# Patient Record
Sex: MALE | Race: WHITE | NOT HISPANIC OR LATINO | Employment: FULL TIME | URBAN - METROPOLITAN AREA
[De-identification: names, ages, dates, MRNs, and addresses within clinical notes are randomized per-mention and may not be internally consistent; named-entity substitution may affect disease eponyms.]

---

## 2017-01-16 ENCOUNTER — OFFICE VISIT (OUTPATIENT)
Dept: FAMILY MEDICINE | Facility: CLINIC | Age: 32
End: 2017-01-16
Payer: COMMERCIAL

## 2017-01-16 VITALS
OXYGEN SATURATION: 97 % | HEIGHT: 71 IN | BODY MASS INDEX: 25.2 KG/M2 | WEIGHT: 180 LBS | DIASTOLIC BLOOD PRESSURE: 81 MMHG | HEART RATE: 84 BPM | SYSTOLIC BLOOD PRESSURE: 123 MMHG | TEMPERATURE: 97.8 F

## 2017-01-16 DIAGNOSIS — F90.2 ATTENTION DEFICIT HYPERACTIVITY DISORDER (ADHD), COMBINED TYPE: Primary | ICD-10-CM

## 2017-01-16 PROCEDURE — 99212 OFFICE O/P EST SF 10 MIN: CPT | Performed by: FAMILY MEDICINE

## 2017-01-16 RX ORDER — DEXTROAMPHETAMINE SACCHARATE, AMPHETAMINE ASPARTATE, DEXTROAMPHETAMINE SULFATE AND AMPHETAMINE SULFATE 5; 5; 5; 5 MG/1; MG/1; MG/1; MG/1
20 TABLET ORAL 2 TIMES DAILY
Qty: 60 TABLET | Refills: 0 | Status: SHIPPED | OUTPATIENT
Start: 2017-01-16 | End: 2017-04-13

## 2017-01-16 RX ORDER — DEXTROAMPHETAMINE SACCHARATE, AMPHETAMINE ASPARTATE, DEXTROAMPHETAMINE SULFATE AND AMPHETAMINE SULFATE 5; 5; 5; 5 MG/1; MG/1; MG/1; MG/1
20 TABLET ORAL 2 TIMES DAILY
Qty: 60 TABLET | Refills: 0 | Status: SHIPPED | OUTPATIENT
Start: 2017-03-18 | End: 2017-04-13

## 2017-01-16 RX ORDER — DEXTROAMPHETAMINE SACCHARATE, AMPHETAMINE ASPARTATE, DEXTROAMPHETAMINE SULFATE AND AMPHETAMINE SULFATE 5; 5; 5; 5 MG/1; MG/1; MG/1; MG/1
20 TABLET ORAL 2 TIMES DAILY
Qty: 60 TABLET | Refills: 0 | Status: SHIPPED | OUTPATIENT
Start: 2017-02-16 | End: 2017-04-13

## 2017-01-16 NOTE — NURSING NOTE
"Chief Complaint   Patient presents with     Recheck Medication     /81 mmHg  Pulse 84  Temp(Src) 97.8  F (36.6  C) (Oral)  Ht 5' 11\" (1.803 m)  Wt 180 lb (81.647 kg)  BMI 25.12 kg/m2  SpO2 97% Estimated body mass index is 25.12 kg/(m^2) as calculated from the following:    Height as of this encounter: 5' 11\" (1.803 m).    Weight as of this encounter: 180 lb (81.647 kg).  BP completed using cuff size: regular       Health Maintenance due pending provider review:  NONE    n/a      Dorita Oliva CMA  "

## 2017-01-16 NOTE — PROGRESS NOTES
SUBJECTIVE:                                                    Germán Hunter is a 31 year old male who presents to clinic today for the following health issues:      Medication Followup of   Adderall    Taking Medication as prescribed: yes    Side Effects:  None    Medication Helping Symptoms:  yes       Has previously been treated for ADHD since he was a kid.    Previously obtained record and this shows that he has been on medication since age 14, stable    Also has some ongoing problems with neck and back soreness, but doesn't need anything for that today          Health Maintenance   Topic Date Due     INFLUENZA VACCINE (SYSTEM ASSIGNED)  09/01/2017     TETANUS IMMUNIZATION (SYSTEM ASSIGNED)  06/02/2024       ROS: As per HPI.    Psych: no depression, no concerns about anxiety  Neuro: no new , numbness, or tingling    I have reviewed and updated the patient's past medical history in the EMR. Current problems are:    Patient Active Problem List   Diagnosis     ADHD (attention deficit hyperactivity disorder)     Controlled substance agreement signed     Overweight (BMI 25.0-29.9)     Past Surgical History   Procedure Laterality Date     Stiches/sutures care  2009     scalp     Stiches/sutures care  2010     forehead       Social History   Substance Use Topics     Smoking status: Never Smoker      Smokeless tobacco: Never Used     Alcohol Use: 0.0 oz/week     0 Standard drinks or equivalent per week      Comment: 3x week     Family History   Problem Relation Age of Onset     Hypertension Mother      Hypertension Father      Neurologic Disorder Father      severe migraines     HEART DISEASE Maternal Grandmother      Depression Paternal Grandmother      DIABETES No family hx of      Coronary Artery Disease No family hx of      Hyperlipidemia No family hx of      CEREBROVASCULAR DISEASE No family hx of      Breast Cancer No family hx of      Colon Cancer No family hx of      Prostate Cancer No family hx of      Other  "Cancer No family hx of      Anxiety Disorder No family hx of      MENTAL ILLNESS No family hx of      Substance Abuse No family hx of      Anesthesia Reaction No family hx of      Asthma No family hx of      OSTEOPOROSIS No family hx of      Genetic Disorder No family hx of      Thyroid Disease No family hx of      Obesity No family hx of      Unknown/Adopted No family hx of          Allergies, reviewed:     Allergies   Allergen Reactions     No Known Drug Allergy        Current Outpatient Prescriptions   Medication Sig Dispense Refill     [START ON 3/18/2017] amphetamine-dextroamphetamine (ADDERALL) 20 MG per tablet Take 1 tablet (20 mg) by mouth 2 times daily 60 tablet 0     [START ON 2/16/2017] amphetamine-dextroamphetamine (ADDERALL) 20 MG per tablet Take 1 tablet (20 mg) by mouth 2 times daily 60 tablet 0     amphetamine-dextroamphetamine (ADDERALL) 20 MG per tablet Take 1 tablet (20 mg) by mouth 2 times daily 60 tablet 0     meloxicam (MOBIC) 15 MG tablet Take 1 tablet (15 mg) by mouth daily 30 tablet 1     cyclobenzaprine (FLEXERIL) 10 MG tablet Take 0.5-1 tablets (5-10 mg) by mouth 3 times daily as needed for muscle spasms 15 tablet 1       Objective:  /81 mmHg  Pulse 84  Temp(Src) 97.8  F (36.6  C) (Oral)  Ht 5' 11\" (1.803 m)  Wt 180 lb (81.647 kg)  BMI 25.12 kg/m2  SpO2 97%  General Appearance: Pleasant, alert, WN/WD in no acute respiratory distress.  Neurologic Exam: Nonfocal, no tremor. Normal gait.  Psychiatric Exam: Alert - appropriate, normal affect    ASSESSMENT/PLAN:    ICD-10-CM    1. Attention deficit hyperactivity disorder (ADHD), combined type F90.2 amphetamine-dextroamphetamine (ADDERALL) 20 MG per tablet     amphetamine-dextroamphetamine (ADDERALL) 20 MG per tablet     amphetamine-dextroamphetamine (ADDERALL) 20 MG per tablet     ADD stable, continue medication    Follow up: Return as needed if symptoms fail to improve    Return to clinic in 3 months for Follow up     Tong Prince " MD [facility]

## 2017-04-13 ENCOUNTER — OFFICE VISIT (OUTPATIENT)
Dept: FAMILY MEDICINE | Facility: CLINIC | Age: 32
End: 2017-04-13
Payer: COMMERCIAL

## 2017-04-13 VITALS
SYSTOLIC BLOOD PRESSURE: 126 MMHG | OXYGEN SATURATION: 97 % | TEMPERATURE: 97.8 F | WEIGHT: 182.4 LBS | HEART RATE: 84 BPM | BODY MASS INDEX: 25.54 KG/M2 | RESPIRATION RATE: 17 BRPM | HEIGHT: 71 IN | DIASTOLIC BLOOD PRESSURE: 76 MMHG

## 2017-04-13 DIAGNOSIS — F90.2 ATTENTION DEFICIT HYPERACTIVITY DISORDER (ADHD), COMBINED TYPE: ICD-10-CM

## 2017-04-13 DIAGNOSIS — M54.2 NECK PAIN: ICD-10-CM

## 2017-04-13 PROCEDURE — 99213 OFFICE O/P EST LOW 20 MIN: CPT | Performed by: FAMILY MEDICINE

## 2017-04-13 RX ORDER — DEXTROAMPHETAMINE SACCHARATE, AMPHETAMINE ASPARTATE, DEXTROAMPHETAMINE SULFATE AND AMPHETAMINE SULFATE 5; 5; 5; 5 MG/1; MG/1; MG/1; MG/1
20 TABLET ORAL 2 TIMES DAILY
Qty: 60 TABLET | Refills: 0 | Status: SHIPPED | OUTPATIENT
Start: 2017-04-13 | End: 2017-10-13

## 2017-04-13 RX ORDER — MELOXICAM 15 MG/1
15 TABLET ORAL DAILY
Qty: 30 TABLET | Refills: 1 | Status: SHIPPED | OUTPATIENT
Start: 2017-04-13 | End: 2017-07-07

## 2017-04-13 RX ORDER — DEXTROAMPHETAMINE SACCHARATE, AMPHETAMINE ASPARTATE, DEXTROAMPHETAMINE SULFATE AND AMPHETAMINE SULFATE 5; 5; 5; 5 MG/1; MG/1; MG/1; MG/1
20 TABLET ORAL 2 TIMES DAILY
Qty: 60 TABLET | Refills: 0 | Status: SHIPPED | OUTPATIENT
Start: 2017-05-13 | End: 2017-10-13

## 2017-04-13 RX ORDER — DEXTROAMPHETAMINE SACCHARATE, AMPHETAMINE ASPARTATE, DEXTROAMPHETAMINE SULFATE AND AMPHETAMINE SULFATE 5; 5; 5; 5 MG/1; MG/1; MG/1; MG/1
20 TABLET ORAL 2 TIMES DAILY
Qty: 60 TABLET | Refills: 0 | Status: SHIPPED | OUTPATIENT
Start: 2017-06-13 | End: 2017-10-13

## 2017-04-13 NOTE — MR AVS SNAPSHOT
"              After Visit Summary   2017    Germán Hunter    MRN: [id number]           Patient Information     Date Of Birth          1985        Visit Information        Provider Department      2017 12:00 PM Tong Sage MD [facility]        Today's Diagnoses     Attention deficit hyperactivity disorder (ADHD), combined type        Neck pain           Follow-ups after your visit        Who to contact     If you have questions or need follow up information about today's clinic visit or your schedule please contact [facility] directly at [phone].  Normal or non-critical lab and imaging results will be communicated to you by [employer], letter or phone within 4 business days after the clinic has received the results. If you do not hear from us within 7 days, please contact the clinic through [employer]urate[employer] or phone. If you have a critical or abnormal lab result, we will notify you by phone as soon as possible.  Submit refill requests through [employer] or call your pharmacy and they will forward the refill request to us. Please allow 3 business days for your refill to be completed.          Additional Information About Your Visit        MyChart Information     [employer] lets you send messages to your doctor, view your test results, renew your prescriptions, schedule appointments and more. To sign up, go to [email or web address] . Click on \"Log in\" on the left side of the screen, which will take you to the Welcome page. Then click on \"Sign up Now\" on the right side of the page.     You will be asked to enter the access code listed below, as well as some personal information. Please follow the directions to create your username and password.     Your access code is: [email or web address]  Expires: 2017 12:23 PM     Your access code will  in 90 days. If you need help or a new code, please call your [facility] or [phone].        Care EveryWhere ID     This is your " "Care EveryWhere ID. This could be used by other organizations to access your [place] medical records  [id number]        Your Vitals Were     Pulse Temperature Respirations Height Pulse Oximetry BMI (Body Mass Index)    84 97.8  F (36.6  C) (Oral) 17 5' 11\" (1.803 m) 97% 25.44 kg/m2       Blood Pressure from Last 3 Encounters:   04/13/17 126/76   01/16/17 123/81   11/18/16 112/78    Weight from Last 3 Encounters:   04/13/17 182 lb 6.4 oz (82.7 kg)   01/16/17 180 lb (81.6 kg)   11/18/16 180 lb (81.6 kg)              Today, you had the following     No orders found for display         Where to get your medicines      These medications were sent to [id number] - [place], [place] - [address]  [address] [address]     Phone:  [phone]     meloxicam 15 MG tablet         Some of these will need a paper prescription and others can be bought over the counter.  Ask your nurse if you have questions.     Bring a paper prescription for each of these medications     amphetamine-dextroamphetamine 20 MG per tablet    amphetamine-dextroamphetamine 20 MG per tablet    amphetamine-dextroamphetamine 20 MG per tablet          Primary Care Provider Office Phone # Fax #    Tong Suresh Sage [phone]       [facility] [address] [address]        Thank you!     Thank you for choosing [facility]  for your care. Our goal is always to provide you with excellent care. Hearing back from our patients is one way we can continue to improve our services. Please take a few minutes to complete the written survey that you may receive in the mail after your visit with us. Thank you!             Your Updated Medication List - Protect others around you: Learn how to safely use, store and throw away your medicines at www.disposemymeds.org.          This list is accurate as of: 4/13/17 12:23 PM.  Always use your most recent med list.       "             Brand Name Dispense Instructions for use    * amphetamine-dextroamphetamine 20 MG per tablet    ADDERALL    60 tablet    Take 1 tablet (20 mg) by mouth 2 times daily       * amphetamine-dextroamphetamine 20 MG per tablet   Start taking on:  5/13/2017    ADDERALL    60 tablet    Take 1 tablet (20 mg) by mouth 2 times daily       * amphetamine-dextroamphetamine 20 MG per tablet   Start taking on:  6/13/2017    ADDERALL    60 tablet    Take 1 tablet (20 mg) by mouth 2 times daily       cyclobenzaprine 10 MG tablet    FLEXERIL    15 tablet    Take 0.5-1 tablets (5-10 mg) by mouth 3 times daily as needed for muscle spasms       meloxicam 15 MG tablet    MOBIC    30 tablet    Take 1 tablet (15 mg) by mouth daily       * Notice:  This list has 3 medication(s) that are the same as other medications prescribed for you. Read the directions carefully, and ask your doctor or other care provider to review them with you.

## 2017-04-13 NOTE — PROGRESS NOTES
SUBJECTIVE:                                                    Germán Hunter is a 32 year old male who presents to clinic today for the following health issues:      Medication Followup of Adderall    Taking Medication as prescribed: yes    Side Effects:  None    Medication Helping Symptoms:  yes       Has previously been treated for ADHD since he was a kid.    Previously obtained record and this shows that he has been on medication since age 14, stable    Also has some ongoing problems with neck and back soreness,       Health Maintenance   Topic Date Due     INFLUENZA VACCINE (SYSTEM ASSIGNED)  09/01/2017     TETANUS IMMUNIZATION (SYSTEM ASSIGNED)  06/02/2024       ROS: As per HPI.    Psych: no depression, no concerns about anxiety  Neuro: no new , numbness, or tingling    I have reviewed and updated the patient's past medical history in the EMR. Current problems are:    Patient Active Problem List   Diagnosis     ADHD (attention deficit hyperactivity disorder)     Controlled substance agreement signed     Overweight (BMI 25.0-29.9)     Past Surgical History:   Procedure Laterality Date     STICHES/SUTURES CARE  2009    scalp     STICHES/SUTURES CARE  2010    forehead       Social History   Substance Use Topics     Smoking status: Never Smoker     Smokeless tobacco: Never Used     Alcohol use 0.0 oz/week     0 Standard drinks or equivalent per week      Comment: 3x week     Family History   Problem Relation Age of Onset     Hypertension Mother      Hypertension Father      Neurologic Disorder Father      severe migraines     HEART DISEASE Maternal Grandmother      Depression Paternal Grandmother      DIABETES No family hx of      Coronary Artery Disease No family hx of      Hyperlipidemia No family hx of      CEREBROVASCULAR DISEASE No family hx of      Breast Cancer No family hx of      Colon Cancer No family hx of      Prostate Cancer No family hx of      Other Cancer No family hx of      Anxiety Disorder No  "family hx of      MENTAL ILLNESS No family hx of      Substance Abuse No family hx of      Anesthesia Reaction No family hx of      Asthma No family hx of      OSTEOPOROSIS No family hx of      Genetic Disorder No family hx of      Thyroid Disease No family hx of      Obesity No family hx of      Unknown/Adopted No family hx of          Allergies, reviewed:     Allergies   Allergen Reactions     No Known Drug Allergy        Current Outpatient Prescriptions   Medication Sig Dispense Refill     [START ON 6/13/2017] amphetamine-dextroamphetamine (ADDERALL) 20 MG per tablet Take 1 tablet (20 mg) by mouth 2 times daily 60 tablet 0     [START ON 5/13/2017] amphetamine-dextroamphetamine (ADDERALL) 20 MG per tablet Take 1 tablet (20 mg) by mouth 2 times daily 60 tablet 0     amphetamine-dextroamphetamine (ADDERALL) 20 MG per tablet Take 1 tablet (20 mg) by mouth 2 times daily 60 tablet 0     meloxicam (MOBIC) 15 MG tablet Take 1 tablet (15 mg) by mouth daily 30 tablet 1     cyclobenzaprine (FLEXERIL) 10 MG tablet Take 0.5-1 tablets (5-10 mg) by mouth 3 times daily as needed for muscle spasms 15 tablet 1       Objective:  /76  Pulse 84  Temp 97.8  F (36.6  C) (Oral)  Resp 17  Ht 5' 11\" (1.803 m)  Wt 182 lb 6.4 oz (82.7 kg)  SpO2 97%  BMI 25.44 kg/m2  General Appearance: Pleasant, alert, WN/WD in no acute respiratory distress.  Neurologic Exam: Nonfocal, no tremor. Normal gait.  Psychiatric Exam: Alert - appropriate, normal affect    ASSESSMENT/PLAN:    ICD-10-CM    1. Attention deficit hyperactivity disorder (ADHD), combined type F90.2 amphetamine-dextroamphetamine (ADDERALL) 20 MG per tablet     amphetamine-dextroamphetamine (ADDERALL) 20 MG per tablet     amphetamine-dextroamphetamine (ADDERALL) 20 MG per tablet   2. Neck pain M54.2      ADD stable, continue medication    Neck pain, long acting nsaids are helpful, continue    Follow up: Return as needed if symptoms fail to improve    Return to clinic in 3 " months for Follow up     Tong Sage MD M[facility]

## 2017-07-07 ENCOUNTER — OFFICE VISIT (OUTPATIENT)
Dept: FAMILY MEDICINE | Facility: CLINIC | Age: 32
End: 2017-07-07
Payer: COMMERCIAL

## 2017-07-07 VITALS
WEIGHT: 181 LBS | OXYGEN SATURATION: 97 % | SYSTOLIC BLOOD PRESSURE: 100 MMHG | RESPIRATION RATE: 16 BRPM | HEART RATE: 88 BPM | TEMPERATURE: 98.4 F | HEIGHT: 71 IN | BODY MASS INDEX: 25.34 KG/M2 | DIASTOLIC BLOOD PRESSURE: 70 MMHG

## 2017-07-07 DIAGNOSIS — F90.2 ATTENTION DEFICIT HYPERACTIVITY DISORDER (ADHD), COMBINED TYPE: Primary | ICD-10-CM

## 2017-07-07 DIAGNOSIS — M54.2 NECK PAIN: ICD-10-CM

## 2017-07-07 DIAGNOSIS — M62.830 BACK MUSCLE SPASM: ICD-10-CM

## 2017-07-07 PROCEDURE — 99213 OFFICE O/P EST LOW 20 MIN: CPT | Performed by: FAMILY MEDICINE

## 2017-07-07 RX ORDER — CYCLOBENZAPRINE HCL 10 MG
5-10 TABLET ORAL 3 TIMES DAILY PRN
Qty: 15 TABLET | Refills: 1 | Status: SHIPPED | OUTPATIENT
Start: 2017-07-07 | End: 2017-10-13

## 2017-07-07 RX ORDER — DEXTROAMPHETAMINE SACCHARATE, AMPHETAMINE ASPARTATE, DEXTROAMPHETAMINE SULFATE AND AMPHETAMINE SULFATE 5; 5; 5; 5 MG/1; MG/1; MG/1; MG/1
20 TABLET ORAL 2 TIMES DAILY
Qty: 60 TABLET | Refills: 0 | Status: SHIPPED | OUTPATIENT
Start: 2017-08-14 | End: 2017-09-13

## 2017-07-07 RX ORDER — DEXTROAMPHETAMINE SACCHARATE, AMPHETAMINE ASPARTATE, DEXTROAMPHETAMINE SULFATE AND AMPHETAMINE SULFATE 5; 5; 5; 5 MG/1; MG/1; MG/1; MG/1
20 TABLET ORAL 2 TIMES DAILY
Qty: 60 TABLET | Refills: 0 | Status: SHIPPED | OUTPATIENT
Start: 2017-07-14 | End: 2017-08-13

## 2017-07-07 RX ORDER — DEXTROAMPHETAMINE SACCHARATE, AMPHETAMINE ASPARTATE, DEXTROAMPHETAMINE SULFATE AND AMPHETAMINE SULFATE 5; 5; 5; 5 MG/1; MG/1; MG/1; MG/1
20 TABLET ORAL 2 TIMES DAILY
Qty: 60 TABLET | Refills: 0 | Status: SHIPPED | OUTPATIENT
Start: 2017-09-14 | End: 2017-10-14

## 2017-07-07 RX ORDER — MELOXICAM 15 MG/1
15 TABLET ORAL DAILY
Qty: 30 TABLET | Refills: 1 | Status: SHIPPED | OUTPATIENT
Start: 2017-07-07 | End: 2017-10-13

## 2017-07-07 NOTE — NURSING NOTE
"Chief Complaint   Patient presents with     A.D.H.D     initial /70 (BP Location: Right arm, Cuff Size: Adult Regular)  Pulse 88  Temp 98.4  F (36.9  C) (Oral)  Resp 16  Ht 5' 11\" (1.803 m)  Wt 181 lb (82.1 kg)  SpO2 97%  BMI 25.24 kg/m2 Estimated body mass index is 25.24 kg/(m^2) as calculated from the following:    Height as of this encounter: 5' 11\" (1.803 m).    Weight as of this encounter: 181 lb (82.1 kg).  BP completed using cuff size: regular.   R arm      Health Maintenance that is potentially due pending provider review:  NONE    n/a    Khanh Duong ma  "

## 2017-07-07 NOTE — MR AVS SNAPSHOT
After Visit Summary   7/7/2017    Germán Hunter    MRN: [id number]           Patient Information     Date Of Birth          1985        Visit Information        Provider Department      7/7/2017 11:00 AM Tong Sage MD [facility]        Today's Diagnoses     Attention deficit hyperactivity disorder (ADHD), combined type    -  1    Neck pain        Back muscle spasm          Care Instructions      [facility]  [phone]    Switch to [facility]            Follow-ups after your visit        Who to contact     If you have questions or need follow up information about today's clinic visit or your schedule please contact [facility] directly at [phone].  Normal or non-critical lab and imaging results will be communicated to you by MyChart, letter or phone within 4 business days after the clinic has received the results. If you do not hear from us within 7 days, please contact the clinic through [employer] or phone. If you have a critical or abnormal lab result, we will notify you by phone as soon as possible.  Submit refill requests through [employer] or call your pharmacy and they will forward the refill request to us. Please allow 3 business days for your refill to be completed.          Additional Information About Your Visit        MyChart Information     [employer] gives you secure access to your electronic health record. If you see a primary care provider, you can also send messages to your care team and make appointments. If you have questions, please call your primary care clinic.  If you do not have a primary care provider, please call [phone] and they will assist you.        Care EveryWhere ID     This is your Care EveryWhere ID. This could be used by other organizations to access your [place] medical records  [id number]        Your Vitals Were     Pulse Temperature Respirations Height Pulse Oximetry BMI (Body Mass Index)    88 98.4  F  "(36.9  C) (Oral) 16 5' 11\" (1.803 m) 97% 25.24 kg/m2       Blood Pressure from Last 3 Encounters:   07/07/17 100/70   04/13/17 126/76   01/16/17 123/81    Weight from Last 3 Encounters:   07/07/17 181 lb (82.1 kg)   04/13/17 182 lb 6.4 oz (82.7 kg)   01/16/17 180 lb (81.6 kg)              Today, you had the following     No orders found for display         Today's Medication Changes          These changes are accurate as of: 7/7/17 12:40 PM.  If you have any questions, ask your nurse or doctor.               These medicines have changed or have updated prescriptions.        Dose/Directions    * amphetamine-dextroamphetamine 20 MG per tablet   Commonly known as:  ADDERALL   This may have changed:  Another medication with the same name was added. Make sure you understand how and when to take each.   Used for:  Attention deficit hyperactivity disorder (ADHD), combined type   Changed by:  Tong Sage MD        Dose:  20 mg   Take 1 tablet (20 mg) by mouth 2 times daily   Quantity:  60 tablet   Refills:  0       * amphetamine-dextroamphetamine 20 MG per tablet   Commonly known as:  ADDERALL   This may have changed:  Another medication with the same name was added. Make sure you understand how and when to take each.   Used for:  Attention deficit hyperactivity disorder (ADHD), combined type   Changed by:  Tong Sage MD        Dose:  20 mg   Take 1 tablet (20 mg) by mouth 2 times daily   Quantity:  60 tablet   Refills:  0       * amphetamine-dextroamphetamine 20 MG per tablet   Commonly known as:  ADDERALL   This may have changed:  Another medication with the same name was added. Make sure you understand how and when to take each.   Used for:  Attention deficit hyperactivity disorder (ADHD), combined type   Changed by:  Tong Sage MD        Dose:  20 mg   Take 1 tablet (20 mg) by mouth 2 times daily   Quantity:  60 tablet   Refills:  0       * amphetamine-dextroamphetamine 20 MG per " tablet   Commonly known as:  ADDERALL   This may have changed:  You were already taking a medication with the same name, and this prescription was added. Make sure you understand how and when to take each.   Used for:  Attention deficit hyperactivity disorder (ADHD), combined type   Changed by:  Tong Sage MD        Dose:  20 mg   Start taking on:  7/14/2017   Take 1 tablet (20 mg) by mouth 2 times daily   Quantity:  60 tablet   Refills:  0       * amphetamine-dextroamphetamine 20 MG per tablet   Commonly known as:  ADDERALL   This may have changed:  You were already taking a medication with the same name, and this prescription was added. Make sure you understand how and when to take each.   Used for:  Attention deficit hyperactivity disorder (ADHD), combined type   Changed by:  Tong Sage MD        Dose:  20 mg   Start taking on:  8/14/2017   Take 1 tablet (20 mg) by mouth 2 times daily   Quantity:  60 tablet   Refills:  0       * amphetamine-dextroamphetamine 20 MG per tablet   Commonly known as:  ADDERALL   This may have changed:  You were already taking a medication with the same name, and this prescription was added. Make sure you understand how and when to take each.   Used for:  Attention deficit hyperactivity disorder (ADHD), combined type   Changed by:  Tong Sage MD        Dose:  20 mg   Start taking on:  9/14/2017   Take 1 tablet (20 mg) by mouth 2 times daily   Quantity:  60 tablet   Refills:  0       * Notice:  This list has 6 medication(s) that are the same as other medications prescribed for you. Read the directions carefully, and ask your doctor or other care provider to review them with you.         Where to get your medicines      These medications were sent to [facility] [id number] [place] [facility] - [place], [place] - [address]  [address] [address]     Phone:  [phone]     cyclobenzaprine 10 MG tablet    meloxicam 15 MG tablet          Some of these will need a paper prescription and others can be bought over the counter.  Ask your nurse if you have questions.     Bring a paper prescription for each of these medications     amphetamine-dextroamphetamine 20 MG per tablet    amphetamine-dextroamphetamine 20 MG per tablet    amphetamine-dextroamphetamine 20 MG per tablet                Primary Care Provider Office Phone # Fax #    Tong Suresh Sage [phone]       [facility] [address] [address]        Equal Access to Services     JOSETTE DENNIS : Hadii aad ku hadasho Soomaali, waaxda luqadaha, qaybta kaalmada adeegyada, waxay idiin hayaan adeeg kharash bharat . So [facility] [phone].    ATENCIÓN: Si habla español, tiene a moulton disposición servicios gratuitos de asistencia lingüística. [facility] [phone].    We comply with applicable federal civil rights laws and [place] laws. We do not discriminate on the basis of race, color, national origin, age, disability sex, sexual orientation or gender identity.            Thank you!     Thank you for choosing [facility]  for your care. Our goal is always to provide you with excellent care. Hearing back from our patients is one way we can continue to improve our services. Please take a few minutes to complete the written survey that you may receive in the mail after your visit with us. Thank you!             Your Updated Medication List - Protect others around you: Learn how to safely use, store and throw away your medicines at www.disposemymeds.org.          This list is accurate as of: 7/7/17 12:40 PM.  Always use your most recent med list.                   Brand Name Dispense Instructions for use Diagnosis    * amphetamine-dextroamphetamine 20 MG per tablet    ADDERALL    60 tablet    Take 1 tablet (20 mg) by mouth 2 times daily    Attention deficit hyperactivity disorder (ADHD), combined type       * amphetamine-dextroamphetamine 20  MG per tablet    ADDERALL    60 tablet    Take 1 tablet (20 mg) by mouth 2 times daily    Attention deficit hyperactivity disorder (ADHD), combined type       * amphetamine-dextroamphetamine 20 MG per tablet    ADDERALL    60 tablet    Take 1 tablet (20 mg) by mouth 2 times daily    Attention deficit hyperactivity disorder (ADHD), combined type       * amphetamine-dextroamphetamine 20 MG per tablet   Start taking on:  7/14/2017    ADDERALL    60 tablet    Take 1 tablet (20 mg) by mouth 2 times daily    Attention deficit hyperactivity disorder (ADHD), combined type       * amphetamine-dextroamphetamine 20 MG per tablet   Start taking on:  8/14/2017    ADDERALL    60 tablet    Take 1 tablet (20 mg) by mouth 2 times daily    Attention deficit hyperactivity disorder (ADHD), combined type       * amphetamine-dextroamphetamine 20 MG per tablet   Start taking on:  9/14/2017    ADDERALL    60 tablet    Take 1 tablet (20 mg) by mouth 2 times daily    Attention deficit hyperactivity disorder (ADHD), combined type       cyclobenzaprine 10 MG tablet    FLEXERIL    15 tablet    Take 0.5-1 tablets (5-10 mg) by mouth 3 times daily as needed for muscle spasms    Back muscle spasm       meloxicam 15 MG tablet    MOBIC    30 tablet    Take 1 tablet (15 mg) by mouth daily    Neck pain       * Notice:  This list has 6 medication(s) that are the same as other medications prescribed for you. Read the directions carefully, and ask your doctor or other care provider to review them with you.

## 2017-07-07 NOTE — PROGRESS NOTES
SUBJECTIVE:                                                    Germán Hunter is a 32 year old male who presents to clinic today for the following health issues:      Medication Followup of Adderall    Taking Medication as prescribed: yes    Side Effects:  None    Medication Helping Symptoms:  yes       Has previously been treated for ADHD since he was a kid.    Previously obtained record and this shows that he has been on medication since age 14, stable      Also has some ongoing problems with neck and back soreness,   Occasionally uses aerobic and Flexeril    Patient was kicked off of his previous insurance and onto [place] [email or web address]  He wants to change this so that he can continue to come here  Gave him the phone number for the county        Health Maintenance   Topic Date Due     INFLUENZA VACCINE (SYSTEM ASSIGNED)  09/01/2017     TETANUS IMMUNIZATION (SYSTEM ASSIGNED)  06/02/2024       ROS: As per HPI.    Psych: no depression, no concerns about anxiety  Neuro: no new , numbness, or tingling    I have reviewed and updated the patient's past medical history in the EMR. Current problems are:    Patient Active Problem List   Diagnosis     ADHD (attention deficit hyperactivity disorder)     Controlled substance agreement signed     Overweight (BMI 25.0-29.9)     Past Surgical History:   Procedure Laterality Date     STICHES/SUTURES CARE  2009    scalp     STICHES/SUTURES CARE  2010    forehead       Social History   Substance Use Topics     Smoking status: Never Smoker     Smokeless tobacco: Never Used     Alcohol use 0.0 oz/week     0 Standard drinks or equivalent per week      Comment: 3x week     Family History   Problem Relation Age of Onset     Hypertension Mother      Hypertension Father      Neurologic Disorder Father      severe migraines     HEART DISEASE Maternal Grandmother      Colon Cancer Maternal Grandmother      Depression Paternal Grandmother      DIABETES No family hx of      Coronary Artery Disease  No family hx of      Hyperlipidemia No family hx of      CEREBROVASCULAR DISEASE No family hx of      Breast Cancer No family hx of      Prostate Cancer No family hx of      Other Cancer No family hx of      Anxiety Disorder No family hx of      MENTAL ILLNESS No family hx of      Substance Abuse No family hx of      Anesthesia Reaction No family hx of      Asthma No family hx of      OSTEOPOROSIS No family hx of      Genetic Disorder No family hx of      Thyroid Disease No family hx of      Obesity No family hx of      Unknown/Adopted No family hx of          Allergies, reviewed:     Allergies   Allergen Reactions     No Known Drug Allergy        Current Outpatient Prescriptions   Medication Sig Dispense Refill     [START ON 7/14/2017] amphetamine-dextroamphetamine (ADDERALL) 20 MG per tablet Take 1 tablet (20 mg) by mouth 2 times daily 60 tablet 0     [START ON 8/14/2017] amphetamine-dextroamphetamine (ADDERALL) 20 MG per tablet Take 1 tablet (20 mg) by mouth 2 times daily 60 tablet 0     [START ON 9/14/2017] amphetamine-dextroamphetamine (ADDERALL) 20 MG per tablet Take 1 tablet (20 mg) by mouth 2 times daily 60 tablet 0     meloxicam (MOBIC) 15 MG tablet Take 1 tablet (15 mg) by mouth daily 30 tablet 1     cyclobenzaprine (FLEXERIL) 10 MG tablet Take 0.5-1 tablets (5-10 mg) by mouth 3 times daily as needed for muscle spasms 15 tablet 1     amphetamine-dextroamphetamine (ADDERALL) 20 MG per tablet Take 1 tablet (20 mg) by mouth 2 times daily 60 tablet 0     amphetamine-dextroamphetamine (ADDERALL) 20 MG per tablet Take 1 tablet (20 mg) by mouth 2 times daily 60 tablet 0     amphetamine-dextroamphetamine (ADDERALL) 20 MG per tablet Take 1 tablet (20 mg) by mouth 2 times daily 60 tablet 0     [DISCONTINUED] meloxicam (MOBIC) 15 MG tablet Take 1 tablet (15 mg) by mouth daily 30 tablet 1       Objective:  /70 (BP Location: Right arm, Cuff Size: Adult Regular)  Pulse 88  Temp 98.4  F (36.9  C) (Oral)  Resp 16  " Ht 5' 11\" (1.803 m)  Wt 181 lb (82.1 kg)  SpO2 97%  BMI 25.24 kg/m2  General Appearance: Pleasant, alert, WN/WD in no acute respiratory distress.  Neurologic Exam: Nonfocal, no tremor. Normal gait.  Psychiatric Exam: Alert - appropriate, normal affect    ASSESSMENT/PLAN:    ICD-10-CM    1. Attention deficit hyperactivity disorder (ADHD), combined type F90.2 amphetamine-dextroamphetamine (ADDERALL) 20 MG per tablet     amphetamine-dextroamphetamine (ADDERALL) 20 MG per tablet     amphetamine-dextroamphetamine (ADDERALL) 20 MG per tablet   2. Neck pain M54.2 meloxicam (MOBIC) 15 MG tablet   3. Back muscle spasm M62.830 cyclobenzaprine (FLEXERIL) 10 MG tablet     ADD stable, continue medication    Neck pain, long acting nsaids are helpful, continue    Follow up: Return as needed if symptoms fail to improve    discussed patient following up with a 3 month E visit or phone visit  And six-month face-to-face visit    Tong Sage MD MPH              "

## 2017-10-13 ENCOUNTER — OFFICE VISIT (OUTPATIENT)
Dept: FAMILY MEDICINE | Facility: CLINIC | Age: 32
End: 2017-10-13
Payer: COMMERCIAL

## 2017-10-13 VITALS
DIASTOLIC BLOOD PRESSURE: 85 MMHG | TEMPERATURE: 97.6 F | HEIGHT: 71 IN | BODY MASS INDEX: 25.1 KG/M2 | HEART RATE: 110 BPM | OXYGEN SATURATION: 97 % | SYSTOLIC BLOOD PRESSURE: 127 MMHG | WEIGHT: 179.3 LBS

## 2017-10-13 DIAGNOSIS — M62.830 BACK MUSCLE SPASM: ICD-10-CM

## 2017-10-13 DIAGNOSIS — F90.2 ATTENTION DEFICIT HYPERACTIVITY DISORDER (ADHD), COMBINED TYPE: ICD-10-CM

## 2017-10-13 DIAGNOSIS — M54.2 NECK PAIN: ICD-10-CM

## 2017-10-13 PROCEDURE — 99213 OFFICE O/P EST LOW 20 MIN: CPT | Performed by: FAMILY MEDICINE

## 2017-10-13 RX ORDER — MELOXICAM 15 MG/1
15 TABLET ORAL DAILY
Qty: 30 TABLET | Refills: 1 | Status: SHIPPED | OUTPATIENT
Start: 2017-10-13 | End: 2018-01-09

## 2017-10-13 RX ORDER — DEXTROAMPHETAMINE SACCHARATE, AMPHETAMINE ASPARTATE, DEXTROAMPHETAMINE SULFATE AND AMPHETAMINE SULFATE 5; 5; 5; 5 MG/1; MG/1; MG/1; MG/1
20 TABLET ORAL 2 TIMES DAILY
Qty: 60 TABLET | Refills: 0 | Status: SHIPPED | OUTPATIENT
Start: 2017-11-13 | End: 2018-01-09

## 2017-10-13 RX ORDER — CYCLOBENZAPRINE HCL 10 MG
5-10 TABLET ORAL 3 TIMES DAILY PRN
Qty: 15 TABLET | Refills: 1 | Status: SHIPPED | OUTPATIENT
Start: 2017-10-13 | End: 2018-07-06

## 2017-10-13 RX ORDER — DEXTROAMPHETAMINE SACCHARATE, AMPHETAMINE ASPARTATE, DEXTROAMPHETAMINE SULFATE AND AMPHETAMINE SULFATE 5; 5; 5; 5 MG/1; MG/1; MG/1; MG/1
20 TABLET ORAL 2 TIMES DAILY
Qty: 60 TABLET | Refills: 0 | Status: SHIPPED | OUTPATIENT
Start: 2017-10-13 | End: 2018-01-09

## 2017-10-13 RX ORDER — DEXTROAMPHETAMINE SACCHARATE, AMPHETAMINE ASPARTATE, DEXTROAMPHETAMINE SULFATE AND AMPHETAMINE SULFATE 5; 5; 5; 5 MG/1; MG/1; MG/1; MG/1
20 TABLET ORAL 2 TIMES DAILY
Qty: 60 TABLET | Refills: 0 | Status: SHIPPED | OUTPATIENT
Start: 2017-12-13 | End: 2018-01-09

## 2017-10-13 NOTE — NURSING NOTE
"Chief Complaint   Patient presents with     Recheck Medication       Initial /85 (BP Location: Left arm, Patient Position: Chair, Cuff Size: Adult Regular)  Pulse 110  Temp 97.6  F (36.4  C) (Oral)  Ht 5' 11\" (1.803 m)  Wt 179 lb 4.8 oz (81.3 kg)  SpO2 97%  BMI 25.01 kg/m2 Estimated body mass index is 25.01 kg/(m^2) as calculated from the following:    Height as of this encounter: 5' 11\" (1.803 m).    Weight as of this encounter: 179 lb 4.8 oz (81.3 kg).  Medication Reconciliation: complete     Ronda Mancia MA     "

## 2017-10-13 NOTE — PROGRESS NOTES
SUBJECTIVE:   Germán Hunter is a 32 year old male who presents to clinic today for the following health issues:      Medication Followup of Adderall, Flexeril, and Mobic     Taking Medication as prescribed: yes    Side Effects:  None    Medication Helping Symptoms:  yes     Has previously been treated for ADHD since he was a kid.    Previously obtained record and this shows that he has been on medication since age 14, stable    Also has some ongoing problems with neck and back soreness,   Occasionally uses aerobic and Flexeril        Health Maintenance   Topic Date Due     INFLUENZA VACCINE (SYSTEM ASSIGNED)  09/01/2017     TETANUS IMMUNIZATION (SYSTEM ASSIGNED)  06/02/2024       ROS: As per HPI.    Psych: no depression, no concerns about anxiety  Neuro: no new , numbness, or tingling    I have reviewed and updated the patient's past medical history in the EMR. Current problems are:    Patient Active Problem List   Diagnosis     ADHD (attention deficit hyperactivity disorder)     Controlled substance agreement signed     Overweight (BMI 25.0-29.9)     Past Surgical History:   Procedure Laterality Date     STICHES/SUTURES CARE  2009    scalp     STICHES/SUTURES CARE  2010    forehead       Social History   Substance Use Topics     Smoking status: Never Smoker     Smokeless tobacco: Never Used     Alcohol use 0.0 oz/week     0 Standard drinks or equivalent per week      Comment: 3x week     Family History   Problem Relation Age of Onset     Hypertension Mother      Hypertension Father      Neurologic Disorder Father      severe migraines     HEART DISEASE Maternal Grandmother      Colon Cancer Maternal Grandmother      Depression Paternal Grandmother      DIABETES No family hx of      Coronary Artery Disease No family hx of      Hyperlipidemia No family hx of      CEREBROVASCULAR DISEASE No family hx of      Breast Cancer No family hx of      Prostate Cancer No family hx of      Other Cancer No family hx of       "Anxiety Disorder No family hx of      MENTAL ILLNESS No family hx of      Substance Abuse No family hx of      Anesthesia Reaction No family hx of      Asthma No family hx of      OSTEOPOROSIS No family hx of      Genetic Disorder No family hx of      Thyroid Disease No family hx of      Obesity No family hx of      Unknown/Adopted No family hx of          Allergies, reviewed:     Allergies   Allergen Reactions     No Known Drug Allergy        Current Outpatient Prescriptions   Medication Sig Dispense Refill     amphetamine-dextroamphetamine (ADDERALL) 20 MG per tablet Take 1 tablet (20 mg) by mouth 2 times daily 60 tablet 0     meloxicam (MOBIC) 15 MG tablet Take 1 tablet (15 mg) by mouth daily 30 tablet 1     cyclobenzaprine (FLEXERIL) 10 MG tablet Take 0.5-1 tablets (5-10 mg) by mouth 3 times daily as needed for muscle spasms 15 tablet 1     amphetamine-dextroamphetamine (ADDERALL) 20 MG per tablet Take 1 tablet (20 mg) by mouth 2 times daily 60 tablet 0     amphetamine-dextroamphetamine (ADDERALL) 20 MG per tablet Take 1 tablet (20 mg) by mouth 2 times daily 60 tablet 0     amphetamine-dextroamphetamine (ADDERALL) 20 MG per tablet Take 1 tablet (20 mg) by mouth 2 times daily 60 tablet 0       Objective:  /85 (BP Location: Left arm, Patient Position: Chair, Cuff Size: Adult Regular)  Pulse 110  Temp 97.6  F (36.4  C) (Oral)  Ht 5' 11\" (1.803 m)  Wt 179 lb 4.8 oz (81.3 kg)  SpO2 97%  BMI 25.01 kg/m2  General Appearance: Pleasant, alert, WN/WD in no acute respiratory distress.  Neurologic Exam: Nonfocal, no tremor. Normal gait.  Psychiatric Exam: Alert - appropriate, normal affect    ASSESSMENT/PLAN:    ICD-10-CM    1. Neck pain M54.2 meloxicam (MOBIC) 15 MG tablet   2. Back muscle spasm M62.830 cyclobenzaprine (FLEXERIL) 10 MG tablet   3. Attention deficit hyperactivity disorder (ADHD), combined type F90.2 amphetamine-dextroamphetamine (ADDERALL) 20 MG per tablet     amphetamine-dextroamphetamine " (ADDERALL) 20 MG per tablet     amphetamine-dextroamphetamine (ADDERALL) 20 MG per tablet     ADD stable, continue medication    Neck pain, long acting nsaids are helpful, continue    Follow up: Return as needed if symptoms fail to improve    discussed patient following up with a 3 month E visit or phone visit  And six-month face-to-face visit    Tong Sage MD MPH

## 2017-10-13 NOTE — MR AVS SNAPSHOT
After Visit Summary   10/13/2017    Germán Hunter    MRN: [id number]           Patient Information     Date Of Birth          1985        Visit Information        Provider Department      10/13/2017 2:00 PM Tong Sage MD [facility]        Today's Diagnoses     Neck pain        Back muscle spasm        Attention deficit hyperactivity disorder (ADHD), combined type           Follow-ups after your visit        Follow-up notes from your care team     Return in about 3 months (around 1/13/2018).      Who to contact     If you have questions or need follow up information about today's clinic visit or your schedule please contact [facility] directly at [phone].  Normal or non-critical lab and imaging results will be communicated to you by MyChart, letter or phone within 4 business days after the clinic has received the results. If you do not hear from us within 7 days, please contact the clinic through [employer] or phone. If you have a critical or abnormal lab result, we will notify you by phone as soon as possible.  Submit refill requests through [employer] or call your pharmacy and they will forward the refill request to us. Please allow 3 business days for your refill to be completed.          Additional Information About Your Visit        MyChart Information     [employer] gives you secure access to your electronic health record. If you see a primary care provider, you can also send messages to your care team and make appointments. If you have questions, please call your primary care clinic.  If you do not have a primary care provider, please call [phone] and they will assist you.        Care EveryWhere ID     This is your Care EveryWhere ID. This could be used by other organizations to access your [facility] medical records  [id number]        Your Vitals Were     Pulse Temperature Height Pulse Oximetry BMI (Body Mass Index)       110 97.6  F (36.4  C) (Oral) 5'  "11\" (1.803 m) 97% 25.01 kg/m2        Blood Pressure from Last 3 Encounters:   10/13/17 127/85   07/07/17 100/70   04/13/17 126/76    Weight from Last 3 Encounters:   10/13/17 179 lb 4.8 oz (81.3 kg)   07/07/17 181 lb (82.1 kg)   04/13/17 182 lb 6.4 oz (82.7 kg)              Today, you had the following     No orders found for display         Where to get your medicines      These medications were sent to [facility] [id number] [facility] - [place], [place] - [address]  [address] [facility] [facility] [address]     Phone:  [phone]     cyclobenzaprine 10 MG tablet    meloxicam 15 MG tablet         Some of these will need a paper prescription and others can be bought over the counter.  Ask your nurse if you have questions.     Bring a paper prescription for each of these medications     amphetamine-dextroamphetamine 20 MG per tablet    amphetamine-dextroamphetamine 20 MG per tablet    amphetamine-dextroamphetamine 20 MG per tablet          Primary Care Provider Office Phone # Fax #    Tong Suresh Sage [phone] [address] [facility] [address]        Equal Access to Services     JOSETTE DENNIS : Hadii moncho chambers hadmichelleo Somadelineali, waaxda luqadaha, qaybta kaalmada adeegyada, rubén massey. So [facility] [phone].    ATENCIÓN: Si habla español, tiene a moulton disposición servicios gratuitos de asistencia lingüística. Llame al [phone].    We comply with applicable federal civil rights laws and [place] laws. We do not discriminate on the basis of race, color, national origin, age, disability, sex, sexual orientation, or gender identity.            Thank you!     Thank you for choosing [facility]  for your care. Our goal is always to provide you with excellent care. Hearing back from our patients is one way we can continue to improve our services. Please take a few minutes to complete the written survey that you may receive in the mail after " your visit with us. Thank you!             Your Updated Medication List - Protect others around you: Learn how to safely use, store and throw away your medicines at www.disposemymeds.org.          This list is accurate as of: 10/13/17 11:59 PM.  Always use your most recent med list.                   Brand Name Dispense Instructions for use Diagnosis    * amphetamine-dextroamphetamine 20 MG per tablet    ADDERALL    60 tablet    Take 1 tablet (20 mg) by mouth 2 times daily    Attention deficit hyperactivity disorder (ADHD), combined type       * amphetamine-dextroamphetamine 20 MG per tablet    ADDERALL    60 tablet    Take 1 tablet (20 mg) by mouth 2 times daily    Attention deficit hyperactivity disorder (ADHD), combined type       * amphetamine-dextroamphetamine 20 MG per tablet   Start taking on:  11/13/2017    ADDERALL    60 tablet    Take 1 tablet (20 mg) by mouth 2 times daily    Attention deficit hyperactivity disorder (ADHD), combined type       * amphetamine-dextroamphetamine 20 MG per tablet   Start taking on:  12/13/2017    ADDERALL    60 tablet    Take 1 tablet (20 mg) by mouth 2 times daily    Attention deficit hyperactivity disorder (ADHD), combined type       cyclobenzaprine 10 MG tablet    FLEXERIL    15 tablet    Take 0.5-1 tablets (5-10 mg) by mouth 3 times daily as needed for muscle spasms    Back muscle spasm       meloxicam 15 MG tablet    MOBIC    30 tablet    Take 1 tablet (15 mg) by mouth daily    Neck pain       * Notice:  This list has 4 medication(s) that are the same as other medications prescribed for you. Read the directions carefully, and ask your doctor or other care provider to review them with you.

## 2018-01-09 ENCOUNTER — OFFICE VISIT (OUTPATIENT)
Dept: FAMILY MEDICINE | Facility: CLINIC | Age: 33
End: 2018-01-09
Payer: COMMERCIAL

## 2018-01-09 VITALS
TEMPERATURE: 97.3 F | HEART RATE: 94 BPM | WEIGHT: 182 LBS | OXYGEN SATURATION: 98 % | SYSTOLIC BLOOD PRESSURE: 104 MMHG | BODY MASS INDEX: 25.48 KG/M2 | HEIGHT: 71 IN | DIASTOLIC BLOOD PRESSURE: 70 MMHG | RESPIRATION RATE: 14 BRPM

## 2018-01-09 DIAGNOSIS — F90.2 ATTENTION DEFICIT HYPERACTIVITY DISORDER (ADHD), COMBINED TYPE: Primary | ICD-10-CM

## 2018-01-09 DIAGNOSIS — S29.012S UPPER BACK STRAIN, SEQUELA: ICD-10-CM

## 2018-01-09 PROCEDURE — 99213 OFFICE O/P EST LOW 20 MIN: CPT | Performed by: FAMILY MEDICINE

## 2018-01-09 RX ORDER — DEXTROAMPHETAMINE SACCHARATE, AMPHETAMINE ASPARTATE, DEXTROAMPHETAMINE SULFATE AND AMPHETAMINE SULFATE 5; 5; 5; 5 MG/1; MG/1; MG/1; MG/1
20 TABLET ORAL 2 TIMES DAILY
Qty: 60 TABLET | Refills: 0 | Status: SHIPPED | OUTPATIENT
Start: 2018-01-09 | End: 2018-01-17

## 2018-01-09 RX ORDER — DEXTROAMPHETAMINE SACCHARATE, AMPHETAMINE ASPARTATE MONOHYDRATE, DEXTROAMPHETAMINE SULFATE AND AMPHETAMINE SULFATE 5; 5; 5; 5 MG/1; MG/1; MG/1; MG/1
20 CAPSULE, EXTENDED RELEASE ORAL DAILY
Qty: 30 CAPSULE | Refills: 0 | Status: SHIPPED | OUTPATIENT
Start: 2018-03-12 | End: 2018-04-11

## 2018-01-09 RX ORDER — DEXTROAMPHETAMINE SACCHARATE, AMPHETAMINE ASPARTATE MONOHYDRATE, DEXTROAMPHETAMINE SULFATE AND AMPHETAMINE SULFATE 5; 5; 5; 5 MG/1; MG/1; MG/1; MG/1
20 CAPSULE, EXTENDED RELEASE ORAL DAILY
Qty: 30 CAPSULE | Refills: 0 | Status: SHIPPED | OUTPATIENT
Start: 2018-01-09 | End: 2018-02-08

## 2018-01-09 RX ORDER — DEXTROAMPHETAMINE SACCHARATE, AMPHETAMINE ASPARTATE, DEXTROAMPHETAMINE SULFATE AND AMPHETAMINE SULFATE 5; 5; 5; 5 MG/1; MG/1; MG/1; MG/1
20 TABLET ORAL 2 TIMES DAILY
Qty: 60 TABLET | Refills: 0 | Status: SHIPPED | OUTPATIENT
Start: 2018-01-09 | End: 2018-01-18

## 2018-01-09 RX ORDER — DEXTROAMPHETAMINE SACCHARATE, AMPHETAMINE ASPARTATE MONOHYDRATE, DEXTROAMPHETAMINE SULFATE AND AMPHETAMINE SULFATE 5; 5; 5; 5 MG/1; MG/1; MG/1; MG/1
20 CAPSULE, EXTENDED RELEASE ORAL DAILY
Qty: 30 CAPSULE | Refills: 0 | Status: SHIPPED | OUTPATIENT
Start: 2018-02-09 | End: 2018-03-11

## 2018-01-09 RX ORDER — CELECOXIB 100 MG/1
100 CAPSULE ORAL 2 TIMES DAILY PRN
Qty: 60 CAPSULE | Refills: 1 | Status: SHIPPED | OUTPATIENT
Start: 2018-01-09 | End: 2018-05-16

## 2018-01-09 NOTE — PROGRESS NOTES
SUBJECTIVE:   Germán Hunter is a 32 year old male who presents to clinic today for the following health issues:      Medication Followup of Adderall    Taking Medication as prescribed: yes    Side Effects:  None    Medication Helping Symptoms:  yes     Would like cholesterol orders for future appt-not fasting today      Has previously been treated for ADHD since he was a kid.    Previously obtained record and this shows that he has been on medication since age 14, stable    Also has some ongoing problems with neck and back soreness,   Occasionally uses Mobic and Flexeril        Health Maintenance   Topic Date Due     INFLUENZA VACCINE (SYSTEM ASSIGNED)  09/01/2017     TETANUS IMMUNIZATION (SYSTEM ASSIGNED)  06/02/2024       ROS: As per HPI.    Psych: no depression, no concerns about anxiety  Neuro: no new , numbness, or tingling    I have reviewed and updated the patient's past medical history in the EMR. Current problems are:    Patient Active Problem List   Diagnosis     ADHD (attention deficit hyperactivity disorder)     Controlled substance agreement signed     Overweight (BMI 25.0-29.9)     Past Surgical History:   Procedure Laterality Date     STICHES/SUTURES CARE  2009    scalp     STICHES/SUTURES CARE  2010    forehead       Social History   Substance Use Topics     Smoking status: Never Smoker     Smokeless tobacco: Never Used     Alcohol use 0.0 oz/week     0 Standard drinks or equivalent per week      Comment: 3x week     Family History   Problem Relation Age of Onset     Hypertension Mother      Hypertension Father      Neurologic Disorder Father      severe migraines     HEART DISEASE Maternal Grandmother      Colon Cancer Maternal Grandmother      Depression Paternal Grandmother      DIABETES No family hx of      Coronary Artery Disease No family hx of      Hyperlipidemia No family hx of      CEREBROVASCULAR DISEASE No family hx of      Breast Cancer No family hx of      Prostate Cancer No family hx  "of      Other Cancer No family hx of      Anxiety Disorder No family hx of      MENTAL ILLNESS No family hx of      Substance Abuse No family hx of      Anesthesia Reaction No family hx of      Asthma No family hx of      OSTEOPOROSIS No family hx of      Genetic Disorder No family hx of      Thyroid Disease No family hx of      Obesity No family hx of      Unknown/Adopted No family hx of          Allergies, reviewed:     Allergies   Allergen Reactions     No Known Drug Allergy        Current Outpatient Prescriptions   Medication Sig Dispense Refill     amphetamine-dextroamphetamine (ADDERALL) 20 MG per tablet Take 1 tablet (20 mg) by mouth 2 times daily 60 tablet 0     amphetamine-dextroamphetamine (ADDERALL) 20 MG per tablet Take 1 tablet (20 mg) by mouth 2 times daily 60 tablet 0     amphetamine-dextroamphetamine (ADDERALL) 20 MG per tablet Take 1 tablet (20 mg) by mouth 2 times daily 60 tablet 0     amphetamine-dextroamphetamine (ADDERALL XR) 20 MG per 24 hr capsule Take 1 capsule (20 mg) by mouth daily 30 capsule 0     [START ON 2/9/2018] amphetamine-dextroamphetamine (ADDERALL XR) 20 MG per 24 hr capsule Take 1 capsule (20 mg) by mouth daily 30 capsule 0     [START ON 3/12/2018] amphetamine-dextroamphetamine (ADDERALL XR) 20 MG per 24 hr capsule Take 1 capsule (20 mg) by mouth daily 30 capsule 0     celecoxib (CELEBREX) 100 MG capsule Take 1 capsule (100 mg) by mouth 2 times daily as needed for moderate pain 60 capsule 1     meloxicam (MOBIC) 15 MG tablet Take 1 tablet (15 mg) by mouth daily 30 tablet 1     cyclobenzaprine (FLEXERIL) 10 MG tablet Take 0.5-1 tablets (5-10 mg) by mouth 3 times daily as needed for muscle spasms 15 tablet 1       Objective:  /70  Pulse 94  Temp 97.3  F (36.3  C) (Tympanic)  Resp 14  Ht 5' 11\" (1.803 m)  Wt 182 lb (82.6 kg)  SpO2 98%  BMI 25.38 kg/m2  General Appearance: Pleasant, alert, WN/WD in no acute respiratory distress.  Neurologic Exam: Nonfocal, no tremor. " Normal gait.  Psychiatric Exam: Alert - appropriate, normal affect    ASSESSMENT/PLAN:    ICD-10-CM    1. Attention deficit hyperactivity disorder (ADHD), combined type F90.2 amphetamine-dextroamphetamine (ADDERALL) 20 MG per tablet     amphetamine-dextroamphetamine (ADDERALL) 20 MG per tablet     amphetamine-dextroamphetamine (ADDERALL) 20 MG per tablet     Lipid panel reflex to direct LDL Fasting     Glucose     amphetamine-dextroamphetamine (ADDERALL XR) 20 MG per 24 hr capsule     amphetamine-dextroamphetamine (ADDERALL XR) 20 MG per 24 hr capsule     amphetamine-dextroamphetamine (ADDERALL XR) 20 MG per 24 hr capsule   2. Upper back strain, sequela S29.012S celecoxib (CELEBREX) 100 MG capsule     ADD stable, continue medication    Neck pain, long acting nsaids are helpful, continue    He sometimes gets side effects from Mobic and would like to try Celebrex instead    Follow up: Return as needed if symptoms fail to improve    discussed patient following up with a 3 month E visit or phone visit  And six-month face-to-face visit    Tong Sage MD MPH

## 2018-01-09 NOTE — LETTER
[facility]  [address] [place]  [facility] [id number] [phone]        September 3, 2019    Germán Hunter  [address] [facility] [address]              Dear Germán Hunter    This is to remind you that your fasting labs are due.    You may call our office at [phone] to schedule an appointment.    Please disregard this notice if you have already had your labs drawn or made an appointment.        Sincerely,        Tong Sage MD

## 2018-01-09 NOTE — NURSING NOTE
"Chief Complaint   Patient presents with     Recheck Medication     follow up for Adderall refills       Initial /70  Pulse 94  Temp 97.3  F (36.3  C) (Tympanic)  Resp 14  Ht 5' 11\" (1.803 m)  Wt 182 lb (82.6 kg)  SpO2 98%  BMI 25.38 kg/m2 Estimated body mass index is 25.38 kg/(m^2) as calculated from the following:    Height as of this encounter: 5' 11\" (1.803 m).    Weight as of this encounter: 182 lb (82.6 kg).  BP completed using cuff size: large    Health Maintenance that is potentially due pending provider review:  Health Maintenance Due   Topic Date Due     INFLUENZA VACCINE (SYSTEM ASSIGNED)  09/01/2017         Flu vaccine done 9-2017  "

## 2018-01-09 NOTE — LETTER
[facility]  [address]  [facility] [id number] [phone]        January 15, 2019    Germán Hunter  [address] [facility] [address]              Dear Germán Hunter    This is to remind you that your fasting labs are due.    You may call our office at [phone] to schedule an appointment.    Please disregard this notice if you have already had your labs drawn or made an appointment.        Sincerely,        Tong Sage MD

## 2018-01-09 NOTE — LETTER
[facility]  [address]  [facility] [id number] [phone]        December 2, 2019    Germán Hunter  [address] [facility] [address]              Dear Germán Hunter    This is to remind you that your fasting labs are due.    You may call our office at [phone] to schedule an appointment.    Please disregard this notice if you have already had your labs drawn or made an appointment.        Sincerely,        Tong Sage MD

## 2018-01-09 NOTE — LETTER
[facility]  [address] [place]  [facility] [id number] [phone]        April 1, 2019    Germán Hunter  [address] [facility] [address]              Dear Germán Hunter    This is to remind you that your fasting labs are due.    You may call our office at [phone] to schedule an appointment.    Please disregard this notice if you have already had your labs drawn or made an appointment.        Sincerely,        Tong Sage MD

## 2018-01-09 NOTE — LETTER
[facility]  [address]  [facility] [id number] [phone]        June 27, 2019    Germán Hunter  [address] [facility] [address]              Dear Germán Hunter    This is to remind you that your fasting labs are due.    You may call our office at [phone] to schedule an appointment.    Please disregard this notice if you have already had your labs drawn or made an appointment.        Sincerely,        Tong Sage MD

## 2018-01-13 ENCOUNTER — TELEPHONE (OUTPATIENT)
Dept: FAMILY MEDICINE | Facility: CLINIC | Age: 33
End: 2018-01-13

## 2018-01-13 DIAGNOSIS — F90.2 ATTENTION DEFICIT HYPERACTIVITY DISORDER (ADHD), COMBINED TYPE: ICD-10-CM

## 2018-01-13 NOTE — TELEPHONE ENCOUNTER
Patient left a message at 2:14pm today for Dr. Sage. He came in last week for a medication recheck. The 3 scripts he received were written for 30 tablets instead of 60. He filled the script for this month and got his 30 tablets but is needing Dr. Sage to write another script for this month and the following 2 months for the other 30 tablets. Please call to discuss if needed and to confirm once the scripts are ready for .    May BANEGAS  Central Scheduler

## 2018-01-15 NOTE — TELEPHONE ENCOUNTER
Left message for patient to callback.  Want to confirm his correct dose and instructions  Looks like [facility] printed XR once daily  But patient was on IR twice daily before?  Rebeca SAGASTUME RN

## 2018-01-17 NOTE — TELEPHONE ENCOUNTER
PT called pt has been taking 40 mg a day 20 mg in the morning and 20 mg in the afternoon. Please call pt if the RX is available

## 2018-01-17 NOTE — TELEPHONE ENCOUNTER
JF  Pt only received the XR prescriptions (rec'd 3 Rxs at [facility]), already filled the January's prescription so would like to exchange February and March's for the IR. According to chart 3 prescriptions for IR were printed at  as well, do you know where these are?    Pended new IR for February and March, please advise,    Nichelle Arana, RN

## 2018-01-18 RX ORDER — DEXTROAMPHETAMINE SACCHARATE, AMPHETAMINE ASPARTATE, DEXTROAMPHETAMINE SULFATE AND AMPHETAMINE SULFATE 5; 5; 5; 5 MG/1; MG/1; MG/1; MG/1
20 TABLET ORAL 2 TIMES DAILY
Qty: 60 TABLET | Refills: 0 | Status: SHIPPED | OUTPATIENT
Start: 2018-03-12 | End: 2018-04-12

## 2018-01-18 RX ORDER — DEXTROAMPHETAMINE SACCHARATE, AMPHETAMINE ASPARTATE, DEXTROAMPHETAMINE SULFATE AND AMPHETAMINE SULFATE 5; 5; 5; 5 MG/1; MG/1; MG/1; MG/1
20 TABLET ORAL 2 TIMES DAILY
Qty: 30 TABLET | Refills: 0 | Status: SHIPPED | OUTPATIENT
Start: 2018-01-18 | End: 2018-04-12

## 2018-01-18 RX ORDER — DEXTROAMPHETAMINE SACCHARATE, AMPHETAMINE ASPARTATE, DEXTROAMPHETAMINE SULFATE AND AMPHETAMINE SULFATE 5; 5; 5; 5 MG/1; MG/1; MG/1; MG/1
20 TABLET ORAL 2 TIMES DAILY
Qty: 60 TABLET | Refills: 0 | Status: SHIPPED | OUTPATIENT
Start: 2018-02-12 | End: 2018-04-12

## 2018-01-18 NOTE — TELEPHONE ENCOUNTER
JF  Patient also needs Rx for #30 for rest of this month.  The one he filled this month was only for #30.  Order T'd up.  Thanks, Autumn Almeida RN    Patient aware Rx ready to  and needs to exchange February and March Rx for #30

## 2018-01-23 NOTE — TELEPHONE ENCOUNTER
Two Rx's for #30 once daily exchanged for three corrected Rx's  Destroyed 2 Rx's with witness SHAQUILLE Elizondo RN

## 2018-04-12 ENCOUNTER — OFFICE VISIT (OUTPATIENT)
Dept: FAMILY MEDICINE | Facility: CLINIC | Age: 33
End: 2018-04-12
Payer: COMMERCIAL

## 2018-04-12 VITALS
TEMPERATURE: 98.7 F | HEART RATE: 88 BPM | SYSTOLIC BLOOD PRESSURE: 138 MMHG | DIASTOLIC BLOOD PRESSURE: 87 MMHG | OXYGEN SATURATION: 97 % | WEIGHT: 177.3 LBS | HEIGHT: 71 IN | BODY MASS INDEX: 24.82 KG/M2

## 2018-04-12 DIAGNOSIS — F90.2 ATTENTION DEFICIT HYPERACTIVITY DISORDER (ADHD), COMBINED TYPE: ICD-10-CM

## 2018-04-12 DIAGNOSIS — G47.21 DELAYED SLEEP PHASE SYNDROME: Primary | ICD-10-CM

## 2018-04-12 PROCEDURE — 99213 OFFICE O/P EST LOW 20 MIN: CPT | Performed by: FAMILY MEDICINE

## 2018-04-12 RX ORDER — DEXTROAMPHETAMINE SACCHARATE, AMPHETAMINE ASPARTATE, DEXTROAMPHETAMINE SULFATE AND AMPHETAMINE SULFATE 5; 5; 5; 5 MG/1; MG/1; MG/1; MG/1
20 TABLET ORAL 2 TIMES DAILY
Qty: 60 TABLET | Refills: 0 | Status: CANCELLED | OUTPATIENT
Start: 2018-04-12

## 2018-04-12 RX ORDER — DEXTROAMPHETAMINE SACCHARATE, AMPHETAMINE ASPARTATE, DEXTROAMPHETAMINE SULFATE AND AMPHETAMINE SULFATE 5; 5; 5; 5 MG/1; MG/1; MG/1; MG/1
20 TABLET ORAL 2 TIMES DAILY
Qty: 60 TABLET | Refills: 0 | Status: SHIPPED | OUTPATIENT
Start: 2018-05-12 | End: 2018-07-06

## 2018-04-12 RX ORDER — DEXTROAMPHETAMINE SACCHARATE, AMPHETAMINE ASPARTATE, DEXTROAMPHETAMINE SULFATE AND AMPHETAMINE SULFATE 5; 5; 5; 5 MG/1; MG/1; MG/1; MG/1
20 TABLET ORAL 2 TIMES DAILY
Qty: 60 TABLET | Refills: 0 | Status: SHIPPED | OUTPATIENT
Start: 2018-06-12 | End: 2018-07-06

## 2018-04-12 RX ORDER — DEXTROAMPHETAMINE SACCHARATE, AMPHETAMINE ASPARTATE, DEXTROAMPHETAMINE SULFATE AND AMPHETAMINE SULFATE 5; 5; 5; 5 MG/1; MG/1; MG/1; MG/1
20 TABLET ORAL 2 TIMES DAILY
Qty: 60 TABLET | Refills: 0 | Status: SHIPPED | OUTPATIENT
Start: 2018-04-12 | End: 2018-07-06

## 2018-04-12 NOTE — MR AVS SNAPSHOT
"              After Visit Summary   4/12/2018    Germán Hunter    MRN: [id number]           Patient Information     Date Of Birth          1985        Visit Information        Provider Department      4/12/2018 12:00 PM Tong Sage MD [facility]        Today's Diagnoses     Delayed sleep phase syndrome    -  1    Attention deficit hyperactivity disorder (ADHD), combined type           Follow-ups after your visit        Who to contact     If you have questions or need follow up information about today's clinic visit or your schedule please contact [facility] directly at [phone].  Normal or non-critical lab and imaging results will be communicated to you by MyChart, letter or phone within 4 business days after the clinic has received the results. If you do not hear from us within 7 days, please contact the clinic through [employer] or phone. If you have a critical or abnormal lab result, we will notify you by phone as soon as possible.  Submit refill requests through [email or web address] or call your pharmacy and they will forward the refill request to us. Please allow 3 business days for your refill to be completed.          Additional Information About Your Visit        MyChart Information     [email or web address] gives you secure access to your electronic health record. If you see a primary care provider, you can also send messages to your care team and make appointments. If you have questions, please call your primary care clinic.  If you do not have a primary care provider, please call [phone] and they will assist you.        Care EveryWhere ID     This is your Care EveryWhere ID. This could be used by other organizations to access your [place] medical records  [id number]        Your Vitals Were     Pulse Temperature Height Pulse Oximetry BMI (Body Mass Index)       88 98.7  F (37.1  C) (Oral) 5' 11\" (1.803 m) 97% 24.73 kg/m2        Blood Pressure from Last 3 Encounters:   04/12/18 138/87 "   01/09/18 104/70   10/13/17 127/85    Weight from Last 3 Encounters:   04/12/18 177 lb 4.8 oz (80.4 kg)   01/09/18 182 lb (82.6 kg)   10/13/17 179 lb 4.8 oz (81.3 kg)              Today, you had the following     No orders found for display         Where to get your medicines      Some of these will need a paper prescription and others can be bought over the counter.  Ask your nurse if you have questions.     Bring a paper prescription for each of these medications     amphetamine-dextroamphetamine 20 MG per tablet    amphetamine-dextroamphetamine 20 MG per tablet    amphetamine-dextroamphetamine 20 MG per tablet          Primary Care Provider Office Phone # Fax #    Tong Suresh Sage [phone] [address] [address]        Equal Access to Services     SAUL DENNIS : Hadii moncho chambers hadasho Sobelinda, waaxda luqadaha, qaybta kaalmada kennedy, rubén nicholson . So [facility] [phone].    ATENCIÓN: Si habla español, tiene a moulton disposición servicios gratuitos de asistencia lingüística. Rose al [phone].    We comply with applicable federal civil rights laws and [place] laws. We do not discriminate on the basis of race, color, national origin, age, disability, sex, sexual orientation, or gender identity.            Thank you!     Thank you for choosing [facility]  for your care. Our goal is always to provide you with excellent care. Hearing back from our patients is one way we can continue to improve our services. Please take a few minutes to complete the written survey that you may receive in the mail after your visit with us. Thank you!             Your Updated Medication List - Protect others around you: Learn how to safely use, store and throw away your medicines at www.disposemymeds.org.          This list is accurate as of 4/12/18 12:42 PM.  Always use your most recent med list.                   Brand Name Dispense  Instructions for use Diagnosis    * amphetamine-dextroamphetamine 20 MG per tablet    ADDERALL    60 tablet    Take 1 tablet (20 mg) by mouth 2 times daily    Attention deficit hyperactivity disorder (ADHD), combined type       * amphetamine-dextroamphetamine 20 MG per tablet   Start taking on:  5/12/2018    ADDERALL    60 tablet    Take 1 tablet (20 mg) by mouth 2 times daily    Attention deficit hyperactivity disorder (ADHD), combined type       * amphetamine-dextroamphetamine 20 MG per tablet   Start taking on:  6/12/2018    ADDERALL    60 tablet    Take 1 tablet (20 mg) by mouth 2 times daily    Attention deficit hyperactivity disorder (ADHD), combined type       celecoxib 100 MG capsule    celeBREX    60 capsule    Take 1 capsule (100 mg) by mouth 2 times daily as needed for moderate pain    Upper back strain, sequela       cyclobenzaprine 10 MG tablet    FLEXERIL    15 tablet    Take 0.5-1 tablets (5-10 mg) by mouth 3 times daily as needed for muscle spasms    Back muscle spasm       * Notice:  This list has 3 medication(s) that are the same as other medications prescribed for you. Read the directions carefully, and ask your doctor or other care provider to review them with you.

## 2018-04-12 NOTE — PROGRESS NOTES
SUBJECTIVE:   Germán Hunter is a 33 year old male who presents to clinic today for the following health issues:        Medication Followup of Adderall    Taking Medication as prescribed: yes    Side Effects:  None    Medication Helping Symptoms:  yes     Has previously been treated for ADHD since he was a kid.    Previously obtained record and this shows that he has been on medication since age 14, stable    Her wonders about delayed sleep phase  He wake up at 11am and goes to bed at 2am  Always has done this  Mainly an issue because his partner does not have this shedule    Also has some ongoing problems with neck and back soreness,   Occasionally uses celebrex and Flexeril        Health Maintenance   Topic Date Due     LIPID SCREEN Q5 YR MALE (SYSTEM ASSIGNED)  03/14/2018     INFLUENZA VACCINE (SYSTEM ASSIGNED)  09/01/2018     TETANUS IMMUNIZATION (SYSTEM ASSIGNED)  06/02/2024       ROS: As per HPI.    Psych: no depression, no concerns about anxiety  Neuro: no new , numbness, or tingling    I have reviewed and updated the patient's past medical history in the EMR. Current problems are:    Patient Active Problem List   Diagnosis     ADHD (attention deficit hyperactivity disorder)     Controlled substance agreement signed     Overweight (BMI 25.0-29.9)     Delayed sleep phase syndrome     Past Surgical History:   Procedure Laterality Date     STICHES/SUTURES CARE  2009    scalp     STICHES/SUTURES CARE  2010    forehead       Social History   Substance Use Topics     Smoking status: Never Smoker     Smokeless tobacco: Never Used     Alcohol use 0.0 oz/week     0 Standard drinks or equivalent per week      Comment: 3x week     Family History   Problem Relation Age of Onset     Hypertension Mother      Hypertension Father      Neurologic Disorder Father      severe migraines     HEART DISEASE Maternal Grandmother      Colon Cancer Maternal Grandmother      Depression Paternal Grandmother      DIABETES No family hx of  "     Coronary Artery Disease No family hx of      Hyperlipidemia No family hx of      CEREBROVASCULAR DISEASE No family hx of      Breast Cancer No family hx of      Prostate Cancer No family hx of      Other Cancer No family hx of      Anxiety Disorder No family hx of      MENTAL ILLNESS No family hx of      Substance Abuse No family hx of      Anesthesia Reaction No family hx of      Asthma No family hx of      OSTEOPOROSIS No family hx of      Genetic Disorder No family hx of      Thyroid Disease No family hx of      Obesity No family hx of      Unknown/Adopted No family hx of          Allergies, reviewed:     Allergies   Allergen Reactions     No Known Drug Allergy        Current Outpatient Prescriptions   Medication Sig Dispense Refill     amphetamine-dextroamphetamine (ADDERALL) 20 MG per tablet Take 1 tablet (20 mg) by mouth 2 times daily 60 tablet 0     [START ON 5/12/2018] amphetamine-dextroamphetamine (ADDERALL) 20 MG per tablet Take 1 tablet (20 mg) by mouth 2 times daily 60 tablet 0     [START ON 6/12/2018] amphetamine-dextroamphetamine (ADDERALL) 20 MG per tablet Take 1 tablet (20 mg) by mouth 2 times daily 60 tablet 0     celecoxib (CELEBREX) 100 MG capsule Take 1 capsule (100 mg) by mouth 2 times daily as needed for moderate pain 60 capsule 1     cyclobenzaprine (FLEXERIL) 10 MG tablet Take 0.5-1 tablets (5-10 mg) by mouth 3 times daily as needed for muscle spasms 15 tablet 1       Objective:  /87  Pulse 88  Temp 98.7  F (37.1  C) (Oral)  Ht 5' 11\" (1.803 m)  Wt 177 lb 4.8 oz (80.4 kg)  SpO2 97%  BMI 24.73 kg/m2  General Appearance: Pleasant, alert, WN/WD in no acute respiratory distress.  Neurologic Exam: Nonfocal, no tremor. Normal gait.  Psychiatric Exam: Alert - appropriate, normal affect    ASSESSMENT/PLAN:    ICD-10-CM    1. Delayed sleep phase syndrome G47.21    2. Attention deficit hyperactivity disorder (ADHD), combined type F90.2 amphetamine-dextroamphetamine (ADDERALL) 20 MG per " tablet     amphetamine-dextroamphetamine (ADDERALL) 20 MG per tablet     amphetamine-dextroamphetamine (ADDERALL) 20 MG per tablet     ADD stable, continue medication    We discussed moving wake time progressively earlier, using ADHD meds upon waking   Keeping normal evening routine but time shift it    Follow up: Return as needed if symptoms fail to improve    discussed patient following up with a 3 month E visit or phone visit  And six-month face-to-face visit    Tong Sage MD MPH

## 2018-05-16 DIAGNOSIS — S29.012S UPPER BACK STRAIN, SEQUELA: ICD-10-CM

## 2018-05-17 RX ORDER — CELECOXIB 100 MG/1
CAPSULE ORAL
Qty: 60 CAPSULE | Refills: 1 | Status: SHIPPED | OUTPATIENT
Start: 2018-05-17 | End: 2019-01-07

## 2018-05-17 NOTE — TELEPHONE ENCOUNTER
"Routing refill request to provider for review/approval because:  Labs not current:  Needs yearly ALT, AST, CBC, and creatinine  Rebeca SAGASTUME RN    Requested Prescriptions   Pending Prescriptions Disp Refills     celecoxib (CELEBREX) 100 MG capsule [Pharmacy Med Name: CELECOXIB 100 MG CAPSULE] 60 capsule 1     Sig: TAKE 1 CAPSULE (100 MG) BY MOUTH 2 TIMES DAILY AS NEEDED FOR MODERATE PAIN    NSAID Medications Failed    5/16/2018  7:25 PM       Failed - Normal ALT on file in past 12 months    Recent Labs   Lab Test 01/14/13   ALT  34            Failed - Normal AST on file in past 12 months    Recent Labs   Lab Test 01/14/13   AST  22            Failed - Normal CBC on file in past 12 months    Recent Labs   Lab Test  06/25/13   1542   WBC  6.2   RBC  5.26   HGB  15.5   HCT  43.4   PLT  186            Failed - Normal serum creatinine on file in past 12 months    Recent Labs   Lab Test 01/14/13   CR  0.9            Passed - Blood pressure under 140/90 in past 12 months    BP Readings from Last 3 Encounters:   04/12/18 138/87   01/09/18 104/70   10/13/17 127/85                Passed - Recent (12 mo) or future (30 days) visit within the authorizing provider's specialty    Patient had office visit in the last 12 months or has a visit in the next 30 days with authorizing provider or within the authorizing provider's specialty.  See \"Patient Info\" tab in inbasket, or \"Choose Columns\" in Meds & Orders section of the refill encounter.           Passed - Patient is age 6-64 years              "

## 2018-07-06 ENCOUNTER — OFFICE VISIT (OUTPATIENT)
Dept: FAMILY MEDICINE | Facility: CLINIC | Age: 33
End: 2018-07-06
Payer: COMMERCIAL

## 2018-07-06 VITALS
BODY MASS INDEX: 23.88 KG/M2 | TEMPERATURE: 99.1 F | HEIGHT: 71 IN | WEIGHT: 170.6 LBS | SYSTOLIC BLOOD PRESSURE: 137 MMHG | OXYGEN SATURATION: 98 % | DIASTOLIC BLOOD PRESSURE: 87 MMHG | HEART RATE: 104 BPM

## 2018-07-06 DIAGNOSIS — F90.2 ATTENTION DEFICIT HYPERACTIVITY DISORDER (ADHD), COMBINED TYPE: ICD-10-CM

## 2018-07-06 DIAGNOSIS — M62.830 BACK MUSCLE SPASM: ICD-10-CM

## 2018-07-06 PROCEDURE — 99214 OFFICE O/P EST MOD 30 MIN: CPT | Performed by: FAMILY MEDICINE

## 2018-07-06 RX ORDER — DEXTROAMPHETAMINE SACCHARATE, AMPHETAMINE ASPARTATE, DEXTROAMPHETAMINE SULFATE AND AMPHETAMINE SULFATE 5; 5; 5; 5 MG/1; MG/1; MG/1; MG/1
20 TABLET ORAL 2 TIMES DAILY
Qty: 60 TABLET | Refills: 0 | Status: CANCELLED | OUTPATIENT
Start: 2018-07-06

## 2018-07-06 RX ORDER — DEXTROAMPHETAMINE SACCHARATE, AMPHETAMINE ASPARTATE, DEXTROAMPHETAMINE SULFATE AND AMPHETAMINE SULFATE 5; 5; 5; 5 MG/1; MG/1; MG/1; MG/1
20 TABLET ORAL 2 TIMES DAILY
Qty: 60 TABLET | Refills: 0 | Status: SHIPPED | OUTPATIENT
Start: 2018-07-12 | End: 2018-08-31

## 2018-07-06 RX ORDER — CYCLOBENZAPRINE HCL 10 MG
5-10 TABLET ORAL 3 TIMES DAILY PRN
Qty: 15 TABLET | Refills: 1 | Status: SHIPPED | OUTPATIENT
Start: 2018-07-06 | End: 2019-01-07

## 2018-07-06 RX ORDER — DEXTROAMPHETAMINE SACCHARATE, AMPHETAMINE ASPARTATE, DEXTROAMPHETAMINE SULFATE AND AMPHETAMINE SULFATE 5; 5; 5; 5 MG/1; MG/1; MG/1; MG/1
20 TABLET ORAL 2 TIMES DAILY
Qty: 60 TABLET | Refills: 0 | Status: SHIPPED | OUTPATIENT
Start: 2018-09-11 | End: 2018-08-31

## 2018-07-06 RX ORDER — DEXTROAMPHETAMINE SACCHARATE, AMPHETAMINE ASPARTATE, DEXTROAMPHETAMINE SULFATE AND AMPHETAMINE SULFATE 5; 5; 5; 5 MG/1; MG/1; MG/1; MG/1
20 TABLET ORAL 2 TIMES DAILY
Qty: 60 TABLET | Refills: 0 | Status: SHIPPED | OUTPATIENT
Start: 2018-08-11 | End: 2018-08-31

## 2018-07-06 NOTE — MR AVS SNAPSHOT
"              After Visit Summary   7/6/2018    Germán Hunter    MRN: [id number]           Patient Information     Date Of Birth          1985        Visit Information        Provider Department      7/6/2018 12:30 PM Tong Sage MD [facility]        Today's Diagnoses     Attention deficit hyperactivity disorder (ADHD), combined type        Back muscle spasm           Follow-ups after your visit        Follow-up notes from your care team     Return in about 3 months (around 10/6/2018).      Who to contact     If you have questions or need follow up information about today's clinic visit or your schedule please contact [facility] directly at [phone].  Normal or non-critical lab and imaging results will be communicated to you by MyChart, letter or phone within 4 business days after the clinic has received the results. If you do not hear from us within 7 days, please contact the clinic through [employer] or phone. If you have a critical or abnormal lab result, we will notify you by phone as soon as possible.  Submit refill requests through [facility] or call your pharmacy and they will forward the refill request to us. Please allow 3 business days for your refill to be completed.          Additional Information About Your Visit        MyChart Information     [facility] gives you secure access to your electronic health record. If you see a primary care provider, you can also send messages to your care team and make appointments. If you have questions, please call your primary care clinic.  If you do not have a primary care provider, please call [phone] and they will assist you.        Care EveryWhere ID     This is your Care EveryWhere ID. This could be used by other organizations to access your [place] medical records  [id number]        Your Vitals Were     Pulse Temperature Height Pulse Oximetry BMI (Body Mass Index)       104 99.1  F (37.3  C) (Oral) 5' 11\" (1.803 m) 98% " 23.79 kg/m2        Blood Pressure from Last 3 Encounters:   07/06/18 137/87   04/12/18 138/87   01/09/18 104/70    Weight from Last 3 Encounters:   07/06/18 170 lb 9.6 oz (77.4 kg)   04/12/18 177 lb 4.8 oz (80.4 kg)   01/09/18 182 lb (82.6 kg)              Today, you had the following     No orders found for display         Today's Medication Changes          These changes are accurate as of 7/6/18  2:25 PM.  If you have any questions, ask your nurse or doctor.               These medicines have changed or have updated prescriptions.        Dose/Directions    * amphetamine-dextroamphetamine 20 MG per tablet   Commonly known as:  ADDERALL   This may have changed:  You were already taking a medication with the same name, and this prescription was added. Make sure you understand how and when to take each.   Used for:  Attention deficit hyperactivity disorder (ADHD), combined type   Changed by:  Tong Sage MD        Dose:  20 mg   Start taking on:  7/12/2018   Take 1 tablet (20 mg) by mouth 2 times daily   Quantity:  60 tablet   Refills:  0       * amphetamine-dextroamphetamine 20 MG per tablet   Commonly known as:  ADDERALL   This may have changed:  These instructions start on 8/11/2018. If you are unsure what to do until then, ask your doctor or other care provider.   Used for:  Attention deficit hyperactivity disorder (ADHD), combined type   Changed by:  Tong Sage MD        Dose:  20 mg   Start taking on:  8/11/2018   Take 1 tablet (20 mg) by mouth 2 times daily   Quantity:  60 tablet   Refills:  0       * amphetamine-dextroamphetamine 20 MG per tablet   Commonly known as:  ADDERALL   This may have changed:  You were already taking a medication with the same name, and this prescription was added. Make sure you understand how and when to take each.   Used for:  Attention deficit hyperactivity disorder (ADHD), combined type   Changed by:  Tong Sage MD        Dose:  20 mg    Start taking on:  9/11/2018   Take 1 tablet (20 mg) by mouth 2 times daily   Quantity:  60 tablet   Refills:  0       * Notice:  This list has 3 medication(s) that are the same as other medications prescribed for you. Read the directions carefully, and ask your doctor or other care provider to review them with you.         Where to get your medicines      These medications were sent to [facility] [id number] [facility] - [place], [place] - [address]  [address] [address]     Phone:  [phone]     cyclobenzaprine 10 MG tablet         Some of these will need a paper prescription and others can be bought over the counter.  Ask your nurse if you have questions.     Bring a paper prescription for each of these medications     amphetamine-dextroamphetamine 20 MG per tablet    amphetamine-dextroamphetamine 20 MG per tablet    amphetamine-dextroamphetamine 20 MG per tablet                Primary Care Provider Office Phone # Fax #    Tong Suresh Sage [phone]       [address] [address]        Equal Access to Services     SAUL DENNIS : Hadii aad ku hadasho Soomaali, waaxda luqadaha, qaybta kaalmada adeegyada, waxay chuckyin hayarminda nicholson . So [facility] [phone].    ATENCIÓN: Si habla español, tiene a moulton disposición servicios gratuitos de asistencia lingüística. Llame al [phone].    We comply with applicable federal civil rights laws and [place] laws. We do not discriminate on the basis of race, color, national origin, age, disability, sex, sexual orientation, or gender identity.            Thank you!     Thank you for choosing [facility]  for your care. Our goal is always to provide you with excellent care. Hearing back from our patients is one way we can continue to improve our services. Please take a few minutes to complete the written survey that you may receive in the mail after your visit with us. Thank you!              Your Updated Medication List - Protect others around you: Learn how to safely use, store and throw away your medicines at www.disposemymeds.org.          This list is accurate as of 7/6/18  2:25 PM.  Always use your most recent med list.                   Brand Name Dispense Instructions for use Diagnosis    * amphetamine-dextroamphetamine 20 MG per tablet   Start taking on:  7/12/2018    ADDERALL    60 tablet    Take 1 tablet (20 mg) by mouth 2 times daily    Attention deficit hyperactivity disorder (ADHD), combined type       * amphetamine-dextroamphetamine 20 MG per tablet   Start taking on:  8/11/2018    ADDERALL    60 tablet    Take 1 tablet (20 mg) by mouth 2 times daily    Attention deficit hyperactivity disorder (ADHD), combined type       * amphetamine-dextroamphetamine 20 MG per tablet   Start taking on:  9/11/2018    ADDERALL    60 tablet    Take 1 tablet (20 mg) by mouth 2 times daily    Attention deficit hyperactivity disorder (ADHD), combined type       celecoxib 100 MG capsule    celeBREX    60 capsule    TAKE 1 CAPSULE (100 MG) BY MOUTH 2 TIMES DAILY AS NEEDED FOR MODERATE PAIN    Upper back strain, sequela       cyclobenzaprine 10 MG tablet    FLEXERIL    15 tablet    Take 0.5-1 tablets (5-10 mg) by mouth 3 times daily as needed for muscle spasms    Back muscle spasm       * Notice:  This list has 3 medication(s) that are the same as other medications prescribed for you. Read the directions carefully, and ask your doctor or other care provider to review them with you.

## 2018-07-06 NOTE — NURSING NOTE
"Chief Complaint   Patient presents with     A.D.H.D     /87  Pulse 104  Temp 99.1  F (37.3  C) (Oral)  Ht 5' 11\" (1.803 m)  Wt 170 lb 9.6 oz (77.4 kg)  SpO2 98%  BMI 23.79 kg/m2 Estimated body mass index is 23.79 kg/(m^2) as calculated from the following:    Height as of this encounter: 5' 11\" (1.803 m).    Weight as of this encounter: 170 lb 9.6 oz (77.4 kg).  Medication Reconciliation: complete      Health Maintenance that is potentially due pending provider review:  NONE    n/a    OLLIE Jacobson  "

## 2018-07-06 NOTE — PROGRESS NOTES
SUBJECTIVE:   Germán Hunter is a 33 year old male who presents to clinic today for the following health issues:      Medication Followup of Adderall 20mg and Flexeril 10mg    Taking Medication as prescribed: yes    Side Effects:  None    Medication Helping Symptoms:  yes       Has previously been treated for ADHD since he was a kid.    Previously obtained record and this shows that he has been on medication since age 14, stable    Her wonders about delayed sleep phase  He wake up at 11am and goes to bed at 2am  Always has done this  Mainly an issue because his partner does not have this shedule    Also has some ongoing problems with neck and back soreness,   Occasionally uses celebrex and Flexeril        Health Maintenance   Topic Date Due     HIV SCREEN (SYSTEM ASSIGNED)  03/14/2001     LIPID SCREEN Q5 YR MALE (SYSTEM ASSIGNED)  03/14/2018     PHQ-2 Q1 YR  07/07/2018     INFLUENZA VACCINE (1) 09/01/2018     TETANUS IMMUNIZATION (SYSTEM ASSIGNED)  06/02/2024       ROS: As per HPI.    Psych: no depression, no concerns about anxiety  Neuro: no new , numbness, or tingling    I have reviewed and updated the patient's past medical history in the EMR. Current problems are:    Patient Active Problem List   Diagnosis     ADHD (attention deficit hyperactivity disorder)     Controlled substance agreement signed     Overweight (BMI 25.0-29.9)     Delayed sleep phase syndrome     Past Surgical History:   Procedure Laterality Date     STICHES/SUTURES CARE  2009    scalp     STICHES/SUTURES CARE  2010    forehead       Social History   Substance Use Topics     Smoking status: Never Smoker     Smokeless tobacco: Never Used     Alcohol use 0.0 oz/week     0 Standard drinks or equivalent per week      Comment: 3x week     Family History   Problem Relation Age of Onset     Hypertension Mother      Hypertension Father      Neurologic Disorder Father      severe migraines     HEART DISEASE Maternal Grandmother      Colon Cancer  "Maternal Grandmother      Depression Paternal Grandmother      Diabetes No family hx of      Coronary Artery Disease No family hx of      Hyperlipidemia No family hx of      Cerebrovascular Disease No family hx of      Breast Cancer No family hx of      Prostate Cancer No family hx of      Other Cancer No family hx of      Anxiety Disorder No family hx of      Mental Illness No family hx of      Substance Abuse No family hx of      Anesthesia Reaction No family hx of      Asthma No family hx of      Osteoperosis No family hx of      Genetic Disorder No family hx of      Thyroid Disease No family hx of      Obesity No family hx of      Unknown/Adopted No family hx of          Allergies, reviewed:     Allergies   Allergen Reactions     No Known Drug Allergy        Current Outpatient Prescriptions   Medication Sig Dispense Refill     [START ON 9/11/2018] amphetamine-dextroamphetamine (ADDERALL) 20 MG per tablet Take 1 tablet (20 mg) by mouth 2 times daily 60 tablet 0     [START ON 8/11/2018] amphetamine-dextroamphetamine (ADDERALL) 20 MG per tablet Take 1 tablet (20 mg) by mouth 2 times daily 60 tablet 0     [START ON 7/12/2018] amphetamine-dextroamphetamine (ADDERALL) 20 MG per tablet Take 1 tablet (20 mg) by mouth 2 times daily 60 tablet 0     celecoxib (CELEBREX) 100 MG capsule TAKE 1 CAPSULE (100 MG) BY MOUTH 2 TIMES DAILY AS NEEDED FOR MODERATE PAIN 60 capsule 1     cyclobenzaprine (FLEXERIL) 10 MG tablet Take 0.5-1 tablets (5-10 mg) by mouth 3 times daily as needed for muscle spasms 15 tablet 1       Objective:  /87  Pulse 104  Temp 99.1  F (37.3  C) (Oral)  Ht 5' 11\" (1.803 m)  Wt 170 lb 9.6 oz (77.4 kg)  SpO2 98%  BMI 23.79 kg/m2  General Appearance: Pleasant, alert, WN/WD in no acute respiratory distress.  Neurologic Exam: Nonfocal, no tremor. Normal gait.  Psychiatric Exam: Alert - appropriate, normal affect    ASSESSMENT/PLAN:    ICD-10-CM    1. Attention deficit hyperactivity disorder (ADHD), " combined type F90.2 amphetamine-dextroamphetamine (ADDERALL) 20 MG per tablet     amphetamine-dextroamphetamine (ADDERALL) 20 MG per tablet     amphetamine-dextroamphetamine (ADDERALL) 20 MG per tablet   2. Back muscle spasm M62.830 cyclobenzaprine (FLEXERIL) 10 MG tablet     ADD stable, continue medication    We discussed moving wake time progressively earlier, using ADHD meds upon waking   Keeping normal evening routine but time shift it    His to have some problem with neck and back spasms takes Celebrex and then if that does not work some cyclobenzaprine    Discussed the risks and benefits of this type of prescription management      Follow up: Return as needed if symptoms fail to improve    discussed patient following up with a 3 month E visit or phone visit  And six-month face-to-face visit    Tong Sage MD MPH

## 2018-07-07 ASSESSMENT — PATIENT HEALTH QUESTIONNAIRE - PHQ9: SUM OF ALL RESPONSES TO PHQ QUESTIONS 1-9: 3

## 2018-08-30 ENCOUNTER — TRANSFERRED RECORDS (OUTPATIENT)
Dept: HEALTH INFORMATION MANAGEMENT | Facility: CLINIC | Age: 33
End: 2018-08-30

## 2018-08-30 ENCOUNTER — TELEPHONE (OUTPATIENT)
Dept: FAMILY MEDICINE | Facility: CLINIC | Age: 33
End: 2018-08-30

## 2018-08-30 NOTE — TELEPHONE ENCOUNTER
Reason for Call: Request for an order or referral:    Order or referral being requested: [facility]    Date needed: as soon as possible    Has the patient been seen by the PCP for this problem? NO    Additional comments: pt called going to [facility], states he might need a referral would like to see about getting     Phone number Patient can be reached at:  Home number on file [phone] (home)    Best Time:  anytime    Can we leave a detailed message on this number?  YES    Call taken on 8/30/2018 at 1:51 PM by Giovanna Jett

## 2018-08-30 NOTE — TELEPHONE ENCOUNTER
Patient states he was planting a couple days ago  Felt a pop in his ankle  Ended up falling   States when got up tendon felt like it was hanging loose  Currently headed to [facility] - wondering if needs referral   Explained to pt [facility] is a [facility]-in[facility] place and referral not needed  Pt states he has state insurance   Told pt if worried should contact his insurance company  Rebeca SAGASTUME RN

## 2018-08-31 ENCOUNTER — VIRTUAL VISIT (OUTPATIENT)
Dept: FAMILY MEDICINE | Facility: CLINIC | Age: 33
End: 2018-08-31
Payer: COMMERCIAL

## 2018-08-31 DIAGNOSIS — F90.2 ATTENTION DEFICIT HYPERACTIVITY DISORDER (ADHD), COMBINED TYPE: Primary | ICD-10-CM

## 2018-08-31 DIAGNOSIS — S93.499A SPRAIN OF OTHER LIGAMENT OF ANKLE, UNSPECIFIED LATERALITY, INITIAL ENCOUNTER: ICD-10-CM

## 2018-08-31 PROCEDURE — 99441 ZZC PHYSICIAN TELEPHONE EVALUATION 5-10 MIN: CPT | Performed by: FAMILY MEDICINE

## 2018-08-31 RX ORDER — DEXTROAMPHETAMINE SACCHARATE, AMPHETAMINE ASPARTATE, DEXTROAMPHETAMINE SULFATE AND AMPHETAMINE SULFATE 5; 5; 5; 5 MG/1; MG/1; MG/1; MG/1
20 TABLET ORAL 2 TIMES DAILY
Qty: 60 TABLET | Refills: 0 | Status: SHIPPED | OUTPATIENT
Start: 2018-10-11 | End: 2019-01-07

## 2018-08-31 RX ORDER — DEXTROAMPHETAMINE SACCHARATE, AMPHETAMINE ASPARTATE, DEXTROAMPHETAMINE SULFATE AND AMPHETAMINE SULFATE 5; 5; 5; 5 MG/1; MG/1; MG/1; MG/1
20 TABLET ORAL 2 TIMES DAILY
Qty: 60 TABLET | Refills: 0 | Status: SHIPPED | OUTPATIENT
Start: 2018-11-11 | End: 2019-01-07

## 2018-08-31 RX ORDER — DEXTROAMPHETAMINE SACCHARATE, AMPHETAMINE ASPARTATE, DEXTROAMPHETAMINE SULFATE AND AMPHETAMINE SULFATE 5; 5; 5; 5 MG/1; MG/1; MG/1; MG/1
20 TABLET ORAL 2 TIMES DAILY
Qty: 60 TABLET | Refills: 0 | Status: SHIPPED | OUTPATIENT
Start: 2018-12-11 | End: 2019-01-07

## 2018-08-31 NOTE — MR AVS SNAPSHOT
After Visit Summary   8/31/2018    Germán Hunter    MRN: [id number]           Patient Information     Date Of Birth          1985        Visit Information        Provider Department      8/31/2018 11:30 AM Tong Sage MD [facility]        Today's Diagnoses     Attention deficit hyperactivity disorder (ADHD), combined type    -  1    Sprain of other ligament of ankle, unspecified laterality, initial encounter           Follow-ups after your visit        Follow-up notes from your care team     Return in about 4 months (around 12/31/2018).      Your next 10 appointments already scheduled     Aug 31, 2018 11:30 AM CDT   Telephone Visit with Tong Sage MD   [facility] ([facility])    [address] [address] [phone]           Note: this is not an onsite visit; there is no need to come to the facility.              Who to contact     If you have questions or need follow up information about today's clinic visit or your schedule please contact [facility] directly at [phone].  Normal or non-critical lab and imaging results will be communicated to you by MyChart, letter or phone within 4 business days after the clinic has received the results. If you do not hear from us within 7 days, please contact the clinic through [employer] or phone. If you have a critical or abnormal lab result, we will notify you by phone as soon as possible.  Submit refill requests through [employer] or call your pharmacy and they will forward the refill request to us. Please allow 3 business days for your refill to be completed.          Additional Information About Your Visit        MyChart Information     [employer] gives you secure access to your electronic health record. If you see a primary care provider, you can also send messages to your care team and make appointments. If you have questions, please call your primary care  clinic.  If you do not have a primary care provider, please call [phone] and they will assist you.        Care EveryWhere ID     This is your Care EveryWhere ID. This could be used by other organizations to access your [place] medical records  [id number]         Blood Pressure from Last 3 Encounters:   07/06/18 137/87   04/12/18 138/87   01/09/18 104/70    Weight from Last 3 Encounters:   07/06/18 170 lb 9.6 oz (77.4 kg)   04/12/18 177 lb 4.8 oz (80.4 kg)   01/09/18 182 lb (82.6 kg)              Today, you had the following     No orders found for display         Today's Medication Changes          These changes are accurate as of 8/31/18 11:21 AM.  If you have any questions, ask your nurse or doctor.               These medicines have changed or have updated prescriptions.        Dose/Directions    * amphetamine-dextroamphetamine 20 MG per tablet   Commonly known as:  ADDERALL   This may have changed:  These instructions start on 10/11/2018. If you are unsure what to do until then, ask your doctor or other care provider.   Used for:  Attention deficit hyperactivity disorder (ADHD), combined type        Dose:  20 mg   Start taking on:  10/11/2018   Take 1 tablet (20 mg) by mouth 2 times daily   Quantity:  60 tablet   Refills:  0       * amphetamine-dextroamphetamine 20 MG per tablet   Commonly known as:  ADDERALL   This may have changed:  These instructions start on 11/11/2018. If you are unsure what to do until then, ask your doctor or other care provider.   Used for:  Attention deficit hyperactivity disorder (ADHD), combined type        Dose:  20 mg   Start taking on:  11/11/2018   Take 1 tablet (20 mg) by mouth 2 times daily   Quantity:  60 tablet   Refills:  0       * amphetamine-dextroamphetamine 20 MG per tablet   Commonly known as:  ADDERALL   This may have changed:  These instructions start on 12/11/2018. If you are unsure what to do until then, ask your doctor or other care provider.   Used for:   Attention deficit hyperactivity disorder (ADHD), combined type        Dose:  20 mg   Start taking on:  12/11/2018   Take 1 tablet (20 mg) by mouth 2 times daily   Quantity:  60 tablet   Refills:  0       * Notice:  This list has 3 medication(s) that are the same as other medications prescribed for you. Read the directions carefully, and ask your doctor or other care provider to review them with you.         Where to get your medicines      Some of these will need a paper prescription and others can be bought over the counter.  Ask your nurse if you have questions.     Bring a paper prescription for each of these medications     amphetamine-dextroamphetamine 20 MG per tablet    amphetamine-dextroamphetamine 20 MG per tablet    amphetamine-dextroamphetamine 20 MG per tablet                Primary Care Provider Office Phone # Fax #    Tong Suresh Sage [phone] [address] [address]        Equal Access to Services     [facility] : Hadii moncho chambers hadasho Sobelinda, waaxda luqadaha, qaybta kaalmada adelettyyada, rubén nicholson . So [facility] [phone].    ATENCIÓN: Si habla español, tiene a moulton disposición servicios gratuitos de asistencia lingüística. Rose al [phone].    We comply with applicable federal civil rights laws and [place] laws. We do not discriminate on the basis of race, color, national origin, age, disability, sex, sexual orientation, or gender identity.            Thank you!     Thank you for choosing [facility]  for your care. Our goal is always to provide you with excellent care. Hearing back from our patients is one way we can continue to improve our services. Please take a few minutes to complete the written survey that you may receive in the mail after your visit with us. Thank you!             Your Updated Medication List - Protect others around you: Learn how to safely use, store and throw away your medicines  at www.disposemymeds.org.          This list is accurate as of 8/31/18 11:21 AM.  Always use your most recent med list.                   Brand Name Dispense Instructions for use Diagnosis    * amphetamine-dextroamphetamine 20 MG per tablet   Start taking on:  10/11/2018    ADDERALL    60 tablet    Take 1 tablet (20 mg) by mouth 2 times daily    Attention deficit hyperactivity disorder (ADHD), combined type       * amphetamine-dextroamphetamine 20 MG per tablet   Start taking on:  11/11/2018    ADDERALL    60 tablet    Take 1 tablet (20 mg) by mouth 2 times daily    Attention deficit hyperactivity disorder (ADHD), combined type       * amphetamine-dextroamphetamine 20 MG per tablet   Start taking on:  12/11/2018    ADDERALL    60 tablet    Take 1 tablet (20 mg) by mouth 2 times daily    Attention deficit hyperactivity disorder (ADHD), combined type       celecoxib 100 MG capsule    celeBREX    60 capsule    TAKE 1 CAPSULE (100 MG) BY MOUTH 2 TIMES DAILY AS NEEDED FOR MODERATE PAIN    Upper back strain, sequela       cyclobenzaprine 10 MG tablet    FLEXERIL    15 tablet    Take 0.5-1 tablets (5-10 mg) by mouth 3 times daily as needed for muscle spasms    Back muscle spasm       * Notice:  This list has 3 medication(s) that are the same as other medications prescribed for you. Read the directions carefully, and ask your doctor or other care provider to review them with you.

## 2018-08-31 NOTE — PROGRESS NOTES
"Germán Hunter is a 33 year old male who is being evaluated via a telephone visit.      The patient has been notified of following:     \"This telephone visit will be conducted via a call between you and your physician/provider. We have found that certain health care needs can be provided without the need for a physical exam.  This service lets us provide the care you need with a short phone conversation.  If a prescription is necessary we can send it directly to your pharmacy.  If lab work is needed we can place an order for that and you can then stop by our lab to have the test done at a later time.    We will bill your insurance company for this service.  Please check with your medical insurance if this type of visit is covered. You may be responsible for the cost of this type of visit if insurance coverage is denied.  The typical cost is $30 (10min), $59 (11-20min) and $85 (21-30min).  Most often these visits are shorter than 10 minutes.    If during the course of the call the physician/provider feels a telephone visit is not appropriate, you will not be charged for this service.\"     Consent has been obtained for this service by care team member: yes.   See the scanned image in the medical record.    Germán Hunter complains of  No chief complaint on file.    I have reviewed and updated the patient's Past Medical History, Social History, Family History and Medication List.    ALLERGIES  No known drug allergy    Magalys Castro MA  [facility] MA Lead  [facility]    Additional provider notes: sprained ankle really bad, tendon subluxed, in a boot for 6 weeks.      adhd stable  A little early, he has one script left, he didn't realize.  I will refill starting in october    Assessment/Plan:  1. Attention deficit hyperactivity disorder (ADHD), combined type    - amphetamine-dextroamphetamine (ADDERALL) 20 MG per tablet; Take 1 tablet (20 mg) by mouth 2 times daily  Dispense: 60 tablet; Refill: 0  - " amphetamine-dextroamphetamine (ADDERALL) 20 MG per tablet; Take 1 tablet (20 mg) by mouth 2 times daily  Dispense: 60 tablet; Refill: 0  - amphetamine-dextroamphetamine (ADDERALL) 20 MG per tablet; Take 1 tablet (20 mg) by mouth 2 times daily  Dispense: 60 tablet; Refill: 0    2. Sprain of other ligament of ankle, unspecified laterality, initial encounter  Discussed referral, PT        I have reviewed the note as documented above.  This accurately captures the substance of my conversation with the patient,      Total time of call between patient and provider was 7 minutes

## 2018-10-11 ENCOUNTER — TRANSFERRED RECORDS (OUTPATIENT)
Dept: HEALTH INFORMATION MANAGEMENT | Facility: CLINIC | Age: 33
End: 2018-10-11

## 2018-12-03 ENCOUNTER — TRANSFERRED RECORDS (OUTPATIENT)
Dept: HEALTH INFORMATION MANAGEMENT | Facility: CLINIC | Age: 33
End: 2018-12-03

## 2019-01-07 ENCOUNTER — OFFICE VISIT (OUTPATIENT)
Dept: FAMILY MEDICINE | Facility: CLINIC | Age: 34
End: 2019-01-07
Payer: COMMERCIAL

## 2019-01-07 VITALS
HEART RATE: 82 BPM | WEIGHT: 179.2 LBS | SYSTOLIC BLOOD PRESSURE: 124 MMHG | DIASTOLIC BLOOD PRESSURE: 85 MMHG | TEMPERATURE: 97.4 F | OXYGEN SATURATION: 100 % | BODY MASS INDEX: 25.09 KG/M2 | HEIGHT: 71 IN

## 2019-01-07 DIAGNOSIS — S29.012S UPPER BACK STRAIN, SEQUELA: ICD-10-CM

## 2019-01-07 DIAGNOSIS — M62.830 BACK MUSCLE SPASM: ICD-10-CM

## 2019-01-07 DIAGNOSIS — F90.2 ATTENTION DEFICIT HYPERACTIVITY DISORDER (ADHD), COMBINED TYPE: ICD-10-CM

## 2019-01-07 PROCEDURE — 99214 OFFICE O/P EST MOD 30 MIN: CPT | Performed by: FAMILY MEDICINE

## 2019-01-07 RX ORDER — DEXTROAMPHETAMINE SACCHARATE, AMPHETAMINE ASPARTATE, DEXTROAMPHETAMINE SULFATE AND AMPHETAMINE SULFATE 5; 5; 5; 5 MG/1; MG/1; MG/1; MG/1
20 TABLET ORAL 2 TIMES DAILY
Qty: 60 TABLET | Refills: 0 | Status: SHIPPED | OUTPATIENT
Start: 2019-03-07 | End: 2020-03-26

## 2019-01-07 RX ORDER — DEXTROAMPHETAMINE SACCHARATE, AMPHETAMINE ASPARTATE, DEXTROAMPHETAMINE SULFATE AND AMPHETAMINE SULFATE 5; 5; 5; 5 MG/1; MG/1; MG/1; MG/1
20 TABLET ORAL 2 TIMES DAILY
Qty: 60 TABLET | Refills: 0 | Status: SHIPPED | OUTPATIENT
Start: 2019-01-07 | End: 2019-04-05

## 2019-01-07 RX ORDER — CYCLOBENZAPRINE HCL 10 MG
5-10 TABLET ORAL 3 TIMES DAILY PRN
Qty: 15 TABLET | Refills: 1 | Status: SHIPPED | OUTPATIENT
Start: 2019-01-07 | End: 2019-04-05

## 2019-01-07 RX ORDER — CELECOXIB 100 MG/1
CAPSULE ORAL
Qty: 60 CAPSULE | Refills: 1 | Status: SHIPPED | OUTPATIENT
Start: 2019-01-07 | End: 2019-09-19

## 2019-01-07 RX ORDER — DEXTROAMPHETAMINE SACCHARATE, AMPHETAMINE ASPARTATE, DEXTROAMPHETAMINE SULFATE AND AMPHETAMINE SULFATE 5; 5; 5; 5 MG/1; MG/1; MG/1; MG/1
20 TABLET ORAL 2 TIMES DAILY
Qty: 60 TABLET | Refills: 0 | Status: SHIPPED | OUTPATIENT
Start: 2019-02-07 | End: 2019-04-05

## 2019-01-07 ASSESSMENT — MIFFLIN-ST. JEOR: SCORE: 1769.98

## 2019-01-07 NOTE — PROGRESS NOTES
SUBJECTIVE:   Germán Hunter is a 33 year old male who presents to clinic today for the following health issues:    Medication Followup of Adderall 20mg     Taking Medication as prescribed: yes    Side Effects:  None    Medication Helping Symptoms:  yes     Has previously been treated for ADHD since he was a kid.    Previously obtained record and this shows that he has been on medication since age 14, stable      Also has some ongoing problems with neck and back soreness,   Occasionally uses celebrex and Flexeril    Had an exacerbation recently    Also had a bad ankle sprain, went to [facility]      Health Maintenance   Topic Date Due     HIV SCREEN (SYSTEM ASSIGNED)  03/14/2001     LIPID SCREEN Q5 YR MALE (SYSTEM ASSIGNED)  03/14/2018     INFLUENZA VACCINE (1) 09/01/2018     PHQ-2 Q1 YR  07/06/2019     DTAP/TDAP/TD IMMUNIZATION (2 - Td) 06/02/2024     ZOSTER IMMUNIZATION (1 of 2) 03/14/2033     IPV IMMUNIZATION  Aged Out     MENINGITIS IMMUNIZATION  Aged Out       ROS: As per HPI.    Psych: no depression, no concerns about anxiety  Neuro: no new , numbness, or tingling    I have reviewed and updated the patient's past medical history in the EMR. Current problems are:    Patient Active Problem List   Diagnosis     ADHD (attention deficit hyperactivity disorder)     Controlled substance agreement signed     Overweight (BMI 25.0-29.9)     Delayed sleep phase syndrome     Past Surgical History:   Procedure Laterality Date     STICHES/SUTURES CARE  2009    scalp     STICHES/SUTURES CARE  2010    forehead       Social History     Tobacco Use     Smoking status: Never Smoker     Smokeless tobacco: Never Used   Substance Use Topics     Alcohol use: Yes     Alcohol/week: 0.0 oz     Comment: 3x week     Family History   Problem Relation Age of Onset     Hypertension Mother      Hypertension Father      Neurologic Disorder Father         severe migraines     Heart Disease Maternal Grandmother      Colon Cancer Maternal Grandmother   "    Depression Paternal Grandmother      Diabetes No family hx of      Coronary Artery Disease No family hx of      Hyperlipidemia No family hx of      Cerebrovascular Disease No family hx of      Breast Cancer No family hx of      Prostate Cancer No family hx of      Other Cancer No family hx of      Anxiety Disorder No family hx of      Mental Illness No family hx of      Substance Abuse No family hx of      Anesthesia Reaction No family hx of      Asthma No family hx of      Osteoporosis No family hx of      Genetic Disorder No family hx of      Thyroid Disease No family hx of      Obesity No family hx of      Unknown/Adopted No family hx of          Allergies, reviewed:     Allergies   Allergen Reactions     No Known Drug Allergy        Current Outpatient Medications   Medication Sig Dispense Refill     amphetamine-dextroamphetamine (ADDERALL) 20 MG per tablet Take 1 tablet (20 mg) by mouth 2 times daily 60 tablet 0     amphetamine-dextroamphetamine (ADDERALL) 20 MG per tablet Take 1 tablet (20 mg) by mouth 2 times daily 60 tablet 0     amphetamine-dextroamphetamine (ADDERALL) 20 MG per tablet Take 1 tablet (20 mg) by mouth 2 times daily 60 tablet 0     celecoxib (CELEBREX) 100 MG capsule TAKE 1 CAPSULE (100 MG) BY MOUTH 2 TIMES DAILY AS NEEDED FOR MODERATE PAIN 60 capsule 1     cyclobenzaprine (FLEXERIL) 10 MG tablet Take 0.5-1 tablets (5-10 mg) by mouth 3 times daily as needed for muscle spasms 15 tablet 1       Objective:  /85   Pulse 82   Temp 97.4  F (36.3  C) (Tympanic)   Ht 1.803 m (5' 11\")   Wt 81.3 kg (179 lb 3.2 oz)   SpO2 100%   BMI 24.99 kg/m    General Appearance: Pleasant, alert, WN/WD in no acute respiratory distress.  Neurologic Exam: Nonfocal, no tremor. Normal gait.  Psychiatric Exam: Alert - appropriate, normal affect    ASSESSMENT/PLAN:    ICD-10-CM    1. Attention deficit hyperactivity disorder (ADHD), combined type F90.2 amphetamine-dextroamphetamine (ADDERALL) 20 MG tablet     " amphetamine-dextroamphetamine (ADDERALL) 20 MG tablet     amphetamine-dextroamphetamine (ADDERALL) 20 MG tablet   2. Upper back strain, sequela S29.012S celecoxib (CELEBREX) 100 MG capsule     cyclobenzaprine (FLEXERIL) 10 MG tablet   3. Back muscle spasm M62.830 celecoxib (CELEBREX) 100 MG capsule     cyclobenzaprine (FLEXERIL) 10 MG tablet     ADD stable, continue medication    His to have some problem with neck and back spasms takes Celebrex and then if that does not work some cyclobenzaprine    Discussed the risks and benefits of this type of prescription management    Follow up: Return as needed if symptoms fail to improve    discussed patient following up with a 3 month E visit or phone visit  And six-month face-to-face visit    Tong Sage MD MPH

## 2019-01-07 NOTE — NURSING NOTE
"Chief Complaint   Patient presents with     A.D.H.D     /85   Pulse 82   Temp 97.4  F (36.3  C) (Tympanic)   Ht 1.803 m (5' 11\")   Wt 81.3 kg (179 lb 3.2 oz)   SpO2 100%   BMI 24.99 kg/m   Estimated body mass index is 24.99 kg/m  as calculated from the following:    Height as of this encounter: 1.803 m (5' 11\").    Weight as of this encounter: 81.3 kg (179 lb 3.2 oz).  Medication Reconciliation: complete      Health Maintenance that is potentially due pending provider review:  NONE    n/a    OLLIE Jacobson  "

## 2019-04-01 ENCOUNTER — TELEPHONE (OUTPATIENT)
Dept: FAMILY MEDICINE | Facility: CLINIC | Age: 34
End: 2019-04-01

## 2019-04-01 NOTE — TELEPHONE ENCOUNTER
Reason for Call:  appointment    Detailed comments: Patient is asking for a telephone visit for his Medication review    Phone Number Patient can be reached at: Home number on file [phone] (home)    Best Time: anytime    Can we leave a detailed message on this number? YES    Call taken on 4/1/2019 at 11:15 AM by Frank Batista

## 2019-04-05 ENCOUNTER — OFFICE VISIT (OUTPATIENT)
Dept: FAMILY MEDICINE | Facility: CLINIC | Age: 34
End: 2019-04-05
Payer: COMMERCIAL

## 2019-04-05 VITALS
BODY MASS INDEX: 25.52 KG/M2 | HEIGHT: 71 IN | TEMPERATURE: 97.7 F | WEIGHT: 182.25 LBS | HEART RATE: 98 BPM | RESPIRATION RATE: 16 BRPM | OXYGEN SATURATION: 98 % | SYSTOLIC BLOOD PRESSURE: 134 MMHG | DIASTOLIC BLOOD PRESSURE: 94 MMHG

## 2019-04-05 DIAGNOSIS — F90.2 ATTENTION DEFICIT HYPERACTIVITY DISORDER (ADHD), COMBINED TYPE: ICD-10-CM

## 2019-04-05 DIAGNOSIS — M62.830 BACK MUSCLE SPASM: ICD-10-CM

## 2019-04-05 DIAGNOSIS — S29.012S UPPER BACK STRAIN, SEQUELA: ICD-10-CM

## 2019-04-05 PROCEDURE — 99213 OFFICE O/P EST LOW 20 MIN: CPT | Performed by: PHYSICIAN ASSISTANT

## 2019-04-05 RX ORDER — DEXTROAMPHETAMINE SACCHARATE, AMPHETAMINE ASPARTATE, DEXTROAMPHETAMINE SULFATE AND AMPHETAMINE SULFATE 5; 5; 5; 5 MG/1; MG/1; MG/1; MG/1
20 TABLET ORAL 2 TIMES DAILY
Qty: 60 TABLET | Refills: 0 | Status: SHIPPED | OUTPATIENT
Start: 2019-06-05 | End: 2019-07-01

## 2019-04-05 RX ORDER — DEXTROAMPHETAMINE SACCHARATE, AMPHETAMINE ASPARTATE, DEXTROAMPHETAMINE SULFATE AND AMPHETAMINE SULFATE 5; 5; 5; 5 MG/1; MG/1; MG/1; MG/1
20 TABLET ORAL 2 TIMES DAILY
Qty: 60 TABLET | Refills: 0 | Status: SHIPPED | OUTPATIENT
Start: 2019-04-05 | End: 2019-07-01

## 2019-04-05 RX ORDER — DEXTROAMPHETAMINE SACCHARATE, AMPHETAMINE ASPARTATE, DEXTROAMPHETAMINE SULFATE AND AMPHETAMINE SULFATE 5; 5; 5; 5 MG/1; MG/1; MG/1; MG/1
20 TABLET ORAL 2 TIMES DAILY
Qty: 60 TABLET | Refills: 0 | Status: CANCELLED | OUTPATIENT
Start: 2019-04-05

## 2019-04-05 RX ORDER — DEXTROAMPHETAMINE SACCHARATE, AMPHETAMINE ASPARTATE, DEXTROAMPHETAMINE SULFATE AND AMPHETAMINE SULFATE 5; 5; 5; 5 MG/1; MG/1; MG/1; MG/1
20 TABLET ORAL 2 TIMES DAILY
Qty: 60 TABLET | Refills: 0 | Status: SHIPPED | OUTPATIENT
Start: 2019-05-06 | End: 2019-07-01

## 2019-04-05 RX ORDER — CYCLOBENZAPRINE HCL 10 MG
5-10 TABLET ORAL 3 TIMES DAILY PRN
Qty: 15 TABLET | Refills: 1 | Status: SHIPPED | OUTPATIENT
Start: 2019-04-05 | End: 2019-09-19

## 2019-04-05 ASSESSMENT — MIFFLIN-ST. JEOR: SCORE: 1778.81

## 2019-04-05 ASSESSMENT — PAIN SCALES - GENERAL: PAINLEVEL: NO PAIN (0)

## 2019-04-05 NOTE — PROGRESS NOTES
"  SUBJECTIVE:   Germán Hunter is a 34 year old male who presents to clinic today for the following health issues:      Medication Followup of Nilesh    Taking Medication as prescribed: yes    Side Effects:  None    Medication Helping Symptoms:  yes           Problem list and histories reviewed & adjusted, as indicated.  Additional history: 33 y/o male here for 3 month follow up.  He has been on adderall for many years, and it continues to be effective in controlled his ADHD symptoms without appreciable side effects.  Denies insomnia, loss of appetite or tachycardia.      He does get occasional flare of neck strain, and does use flexeril.  It does help him relax and sleep better.  Does not tend to take during the day.    BP Readings from Last 3 Encounters:   04/05/19 (!) 134/94   01/07/19 124/85   07/06/18 137/87    Wt Readings from Last 3 Encounters:   04/05/19 82.7 kg (182 lb 4 oz)   01/07/19 81.3 kg (179 lb 3.2 oz)   07/06/18 77.4 kg (170 lb 9.6 oz)                    Reviewed and updated as needed this visit by clinical staff  Allergies  Meds       Reviewed and updated as needed this visit by Provider         ROS:  Constitutional, HEENT, cardiovascular, pulmonary, gi and gu systems are negative, except as otherwise noted.    OBJECTIVE:     BP (!) 134/94 (BP Location: Right arm, Patient Position: Sitting, Cuff Size: Adult Regular)   Pulse 98   Temp 97.7  F (36.5  C) (Oral)   Resp 16   Ht 1.803 m (5' 11\")   Wt 82.7 kg (182 lb 4 oz)   SpO2 98%   BMI 25.42 kg/m    Body mass index is 25.42 kg/m .  GENERAL: alert and no distress  EYES: Eyes grossly normal to inspection  NECK: no adenopathy, no asymmetry, masses, or scars and thyroid normal to palpation  RESP: lungs clear to auscultation - no rales, rhonchi or wheezes  CV: regular rate and rhythm, normal S1 S2, no S3 or S4, no murmur, click or rub, no peripheral edema and peripheral pulses strong  PSYCH: mentation appears normal, affect " normal/bright    Diagnostic Test Results:  none     ASSESSMENT/PLAN:         BP Screening:   Last 3 BP Readings:    BP Readings from Last 3 Encounters:   04/05/19 (!) 134/94   01/07/19 124/85   07/06/18 137/87       The following was recommended to the patient:  Re-screen within 4 weeks and recommend lifestyle modifications    1. Attention deficit hyperactivity disorder (ADHD), combined type  Stable.  Will want to recheck BP at next visit.  - amphetamine-dextroamphetamine (ADDERALL) 20 MG tablet; Take 1 tablet (20 mg) by mouth 2 times daily  Dispense: 60 tablet; Refill: 0  - amphetamine-dextroamphetamine (ADDERALL) 20 MG tablet; Take 1 tablet (20 mg) by mouth 2 times daily  Dispense: 60 tablet; Refill: 0  - amphetamine-dextroamphetamine (ADDERALL) 20 MG tablet; Take 1 tablet (20 mg) by mouth 2 times daily  Dispense: 60 tablet; Refill: 0    2. Back muscle spasm    - cyclobenzaprine (FLEXERIL) 10 MG tablet; Take 0.5-1 tablets (5-10 mg) by mouth 3 times daily as needed for muscle spasms  Dispense: 15 tablet; Refill: 1    3. Upper back strain, sequela    - cyclobenzaprine (FLEXERIL) 10 MG tablet; Take 0.5-1 tablets (5-10 mg) by mouth 3 times daily as needed for muscle spasms  Dispense: 15 tablet; Refill: 1        Tony Osman PA-C  [facility]

## 2019-07-01 ENCOUNTER — OFFICE VISIT (OUTPATIENT)
Dept: FAMILY MEDICINE | Facility: CLINIC | Age: 34
End: 2019-07-01
Payer: COMMERCIAL

## 2019-07-01 VITALS
WEIGHT: 179.56 LBS | HEIGHT: 71 IN | BODY MASS INDEX: 25.14 KG/M2 | TEMPERATURE: 98.3 F | HEART RATE: 83 BPM | DIASTOLIC BLOOD PRESSURE: 85 MMHG | RESPIRATION RATE: 16 BRPM | SYSTOLIC BLOOD PRESSURE: 125 MMHG | OXYGEN SATURATION: 99 %

## 2019-07-01 DIAGNOSIS — F90.2 ATTENTION DEFICIT HYPERACTIVITY DISORDER (ADHD), COMBINED TYPE: ICD-10-CM

## 2019-07-01 DIAGNOSIS — M25.571 PAIN IN JOINT INVOLVING ANKLE AND FOOT, RIGHT: Primary | ICD-10-CM

## 2019-07-01 DIAGNOSIS — M25.562 ACUTE PAIN OF LEFT KNEE: ICD-10-CM

## 2019-07-01 PROCEDURE — 99214 OFFICE O/P EST MOD 30 MIN: CPT | Performed by: PHYSICIAN ASSISTANT

## 2019-07-01 RX ORDER — DEXTROAMPHETAMINE SACCHARATE, AMPHETAMINE ASPARTATE, DEXTROAMPHETAMINE SULFATE AND AMPHETAMINE SULFATE 5; 5; 5; 5 MG/1; MG/1; MG/1; MG/1
20 TABLET ORAL 2 TIMES DAILY
Qty: 60 TABLET | Refills: 0 | Status: SHIPPED | OUTPATIENT
Start: 2019-09-01 | End: 2019-07-25

## 2019-07-01 RX ORDER — DEXTROAMPHETAMINE SACCHARATE, AMPHETAMINE ASPARTATE, DEXTROAMPHETAMINE SULFATE AND AMPHETAMINE SULFATE 5; 5; 5; 5 MG/1; MG/1; MG/1; MG/1
20 TABLET ORAL 2 TIMES DAILY
Qty: 60 TABLET | Refills: 0 | Status: SHIPPED | OUTPATIENT
Start: 2019-08-01 | End: 2019-07-25

## 2019-07-01 RX ORDER — DEXTROAMPHETAMINE SACCHARATE, AMPHETAMINE ASPARTATE, DEXTROAMPHETAMINE SULFATE AND AMPHETAMINE SULFATE 5; 5; 5; 5 MG/1; MG/1; MG/1; MG/1
20 TABLET ORAL 2 TIMES DAILY
Qty: 60 TABLET | Refills: 0 | Status: SHIPPED | OUTPATIENT
Start: 2019-07-01 | End: 2019-07-25

## 2019-07-01 ASSESSMENT — MIFFLIN-ST. JEOR: SCORE: 1766.62

## 2019-07-01 ASSESSMENT — PAIN SCALES - GENERAL: PAINLEVEL: NO PAIN (0)

## 2019-07-01 NOTE — PROGRESS NOTES
"Subjective     Germán Hunter is a 34 year old male who presents to clinic today for the following health issues:    HPI   Medication Followup of all    Taking Medication as prescribed: yes    Side Effects:  None    Medication Helping Symptoms:  yes     Musculoskeletal problem/pain      Duration: 2 weeks since last flare up    Description  Location: right ankle and left knee    Intensity:  moderate, severe    Accompanying signs and symptoms: none    History  Previous similar problem: no   Previous evaluation:  none    Precipitating or alleviating factors:  Trauma or overuse: YES-  right ankle was in a boot for 6 weeks  Aggravating factors include: standing, walking and lifting    Therapies tried and outcome: nothing    33 y/o male here for 3 month follow up.  He has been doing well from an ADHD standpoint.  Continues to do well with adderall without side effects.  Helps him keep his focus and perform tasks accurately.    He has a long history of right ankle issues.  He did work with ortho last fall and he did wear a boot for 6 weeks.  It did improve, but he has had several sprains since then, and just feels that his ankle is quite lax.  Since then, he has also put more stress on left knee and has had some pain, and a few twists/injuries.  Pretty active person.    BP Readings from Last 3 Encounters:   07/01/19 125/85   04/05/19 (!) 134/94   01/07/19 124/85    Wt Readings from Last 3 Encounters:   07/01/19 81.4 kg (179 lb 9 oz)   04/05/19 82.7 kg (182 lb 4 oz)   01/07/19 81.3 kg (179 lb 3.2 oz)                      Reviewed and updated as needed this visit by Provider         Review of Systems   ROS COMP: Constitutional, HEENT, cardiovascular, pulmonary, gi and gu systems are negative, except as otherwise noted.      Objective    /85 (BP Location: Right arm, Patient Position: Sitting, Cuff Size: Adult Large)   Pulse 83   Temp 98.3  F (36.8  C) (Oral)   Resp 16   Ht 1.803 m (5' 11\")   Wt 81.4 kg (179 lb 9 oz)  " " SpO2 99%   BMI 25.04 kg/m    Body mass index is 25.04 kg/m .  Physical Exam   GENERAL: alert and no distress  EYES: Eyes grossly normal to inspection  HENT: ear canals and TM's normal, nose and mouth without ulcers or lesions  RESP: lungs clear to auscultation - no rales, rhonchi or wheezes  CV: regular rate and rhythm, normal S1 S2, no S3 or S4, no murmur, click or rub, no peripheral edema and peripheral pulses strong  MS: no appreciable swelling over ankle of knee.  Limited active ROM in right ankle.  Unable to fully extend.  Mild tender ATFL.  left knee has crepitus with ROM and some mild catching with full flexion.  PSYCH: mentation appears normal, affect normal/bright    Diagnostic Test Results:  Labs reviewed in Epic        Assessment & Plan     1. Pain in joint involving ankle and foot, right  Due to chronic nature of pain and instability, will get imaging.  - MR Ankle Right w/o Contrast; Future    2. Acute pain of left knee  As above  - MR Knee Left w/o Contrast; Future    3. Attention deficit hyperactivity disorder (ADHD), combined type  Stable, doing well.  No concerns.    - amphetamine-dextroamphetamine (ADDERALL) 20 MG tablet; Take 1 tablet (20 mg) by mouth 2 times daily  Dispense: 60 tablet; Refill: 0  - amphetamine-dextroamphetamine (ADDERALL) 20 MG tablet; Take 1 tablet (20 mg) by mouth 2 times daily  Dispense: 60 tablet; Refill: 0  - amphetamine-dextroamphetamine (ADDERALL) 20 MG tablet; Take 1 tablet (20 mg) by mouth 2 times daily  Dispense: 60 tablet; Refill: 0     BMI:   Estimated body mass index is 25.04 kg/m  as calculated from the following:    Height as of this encounter: 1.803 m (5' 11\").    Weight as of this encounter: 81.4 kg (179 lb 9 oz).               No follow-ups on file.    Tony Osman PA-C  [facility]      "

## 2019-07-05 ENCOUNTER — ANCILLARY PROCEDURE (OUTPATIENT)
Dept: MRI IMAGING | Facility: CLINIC | Age: 34
End: 2019-07-05
Attending: PHYSICIAN ASSISTANT
Payer: COMMERCIAL

## 2019-07-05 DIAGNOSIS — M25.562 ACUTE PAIN OF LEFT KNEE: ICD-10-CM

## 2019-07-05 DIAGNOSIS — M25.571 PAIN IN JOINT INVOLVING ANKLE AND FOOT, RIGHT: ICD-10-CM

## 2019-07-08 NOTE — RESULT ENCOUNTER NOTE
Dear Germán    Your test results are attached, feel free to contact me via [employer]     I am releasing your MRI result of both your knee and ankle.  I think the next step is to consult with orthopedics.  You do have a tear of your meniscus in your knee as well as possible tear in ankle.  Let me know if you need a referral, but most insurances do not.  I usually recommend [facility].  Let me know if you have any questions.    Brayan Osman PA-C

## 2019-07-19 ENCOUNTER — TRANSFERRED RECORDS (OUTPATIENT)
Dept: HEALTH INFORMATION MANAGEMENT | Facility: CLINIC | Age: 34
End: 2019-07-19

## 2019-07-22 ENCOUNTER — TRANSFERRED RECORDS (OUTPATIENT)
Dept: HEALTH INFORMATION MANAGEMENT | Facility: CLINIC | Age: 34
End: 2019-07-22

## 2019-07-25 ENCOUNTER — OFFICE VISIT (OUTPATIENT)
Dept: FAMILY MEDICINE | Facility: CLINIC | Age: 34
End: 2019-07-25
Payer: COMMERCIAL

## 2019-07-25 VITALS
DIASTOLIC BLOOD PRESSURE: 85 MMHG | TEMPERATURE: 97.1 F | OXYGEN SATURATION: 97 % | HEART RATE: 77 BPM | WEIGHT: 183 LBS | RESPIRATION RATE: 14 BRPM | SYSTOLIC BLOOD PRESSURE: 126 MMHG | HEIGHT: 71 IN | BODY MASS INDEX: 25.62 KG/M2

## 2019-07-25 DIAGNOSIS — S93.491A SPRAIN OF OTHER LIGAMENT OF RIGHT ANKLE, INITIAL ENCOUNTER: ICD-10-CM

## 2019-07-25 DIAGNOSIS — Z01.818 PREOP GENERAL PHYSICAL EXAM: Primary | ICD-10-CM

## 2019-07-25 PROCEDURE — 99214 OFFICE O/P EST MOD 30 MIN: CPT | Performed by: FAMILY MEDICINE

## 2019-07-25 ASSESSMENT — MIFFLIN-ST. JEOR: SCORE: 1782.21

## 2019-07-25 ASSESSMENT — PAIN SCALES - GENERAL: PAINLEVEL: NO PAIN (0)

## 2019-07-25 NOTE — NURSING NOTE
"Chief Complaint   Patient presents with     Pre-Op Exam     /85 (BP Location: Left arm, Patient Position: Sitting, Cuff Size: Adult Regular)   Pulse 77   Temp 97.1  F (36.2  C) (Oral)   Resp 14   Ht 1.803 m (5' 11\")   Wt 104.8 kg (231 lb)   SpO2 97%   BMI 32.22 kg/m   Estimated body mass index is 32.22 kg/m  as calculated from the following:    Height as of this encounter: 1.803 m (5' 11\").    Weight as of this encounter: 104.8 kg (231 lb).  bp completed using cuff size: regular      Health Maintenance addressed:  NONE    N/a  Maty Portillo CMA on 7/25/2019 at 12:14 PM                "

## 2019-07-25 NOTE — PROGRESS NOTES
[facility]  [address] [place]  [facility] [address] [phone]  Dept: [phone]    PRE-OP EVALUATION:  Today's date: 2019    Germán Hunter (: 1985) presents for pre-operative evaluation assessment as requested by Dr. rikki Stanton.  He requires evaluation and anesthesia risk assessment prior to undergoing surgery/procedure for treatment of Peroneal tendon grove    Aug 19th    Fax number for surgical facility:   Primary Physician: Tong Sage  Type of Anesthesia Anticipated: General    Patient has a Health Care Directive or Living Will:  NO    Preop Questions 2019   Who is doing your surgery? joselyn alaniz   What are you having done? ankle surgery for peroneal tendon   Date of Surgery/Procedure:    Facility or Hospital where procedure/surgery will be performed: [facility]   1.  Do you have a history of Heart attack, stroke, stent, coronary bypass surgery, or other heart surgery? No   2.  Do you ever have any pain or discomfort in your chest? No   3.  Do you have a history of  Heart Failure? No   4.   Are you troubled by shortness of breath when:  walking on a level surface, or up a slight hill, or at night? No   5.  Do you currently have a cold, bronchitis or other respiratory infection? No   6.  Do you have a cough, shortness of breath, or wheezing? No   7.  Do you sometimes get pains in the calves of your legs when you walk? YES -    8. Do you or anyone in your family have previous history of blood clots? No   9.  Do you or does anyone in your family have a serious bleeding problem such as prolonged bleeding following surgeries or cuts? No   10. Have you ever had problems with anemia or been told to take iron pills? No   11. Have you had any abnormal blood loss such as black, tarry or bloody stools? No   12. Have you ever had a blood transfusion? No   13. Have you or any of your relatives ever had problems with anesthesia? No   14. Do you have sleep  apnea, excessive snoring or daytime drowsiness? No   15. Do you have any prosthetic heart valves? No   16. Do you have prosthetic joints? No         HPI:     HPI related to upcoming procedure: Ankle pain with disloation of peroneal tendon for about 1 year, worsening needs to have a surgery      See problem list for active medical problems.  Problems all longstanding and stable, except as noted/documented.  See ROS for pertinent symptoms related to these conditions.      MEDICAL HISTORY:     Patient Active Problem List    Diagnosis Date Noted     Delayed sleep phase syndrome 04/12/2018     Priority: Medium     Overweight (BMI 25.0-29.9) 12/17/2015     Priority: Medium     ADHD (attention deficit hyperactivity disorder) 06/25/2013     Priority: Medium     Started adderall age 14, problems that he was having started earlier than that  Patient is followed by [facility] for ongoing prescription of stimulants.  All refills should be approved by this provider, or covering partner.    Medication(s): Adderall .   Maximum quantity per month: #60  Clinic visit frequency required: Q 3 months     Controlled substance agreement on file:      Date(s): 6/25/2013  Neuropsych evaluation for ADD completed:      Last [facility] website verification:  done on 9/3/2015   [email or web address]           Controlled substance agreement signed 06/25/2013     Priority: Medium      Past Medical History:   Diagnosis Date     Depression     in HS, was on medication     Past Surgical History:   Procedure Laterality Date     STICHES/SUTURES CARE  2009    scalp     STICHES/SUTURES CARE  2010    forehead     Current Outpatient Medications   Medication Sig Dispense Refill     amphetamine-dextroamphetamine (ADDERALL) 20 MG tablet Take 1 tablet (20 mg) by mouth 2 times daily 60 tablet 0     [START ON 8/1/2019] amphetamine-dextroamphetamine (ADDERALL) 20 MG tablet Take 1 tablet (20 mg) by mouth 2 times daily 60 tablet 0     [START ON  "9/1/2019] amphetamine-dextroamphetamine (ADDERALL) 20 MG tablet Take 1 tablet (20 mg) by mouth 2 times daily 60 tablet 0     amphetamine-dextroamphetamine (ADDERALL) 20 MG tablet Take 1 tablet (20 mg) by mouth 2 times daily 60 tablet 0     celecoxib (CELEBREX) 100 MG capsule TAKE 1 CAPSULE (100 MG) BY MOUTH 2 TIMES DAILY AS NEEDED FOR MODERATE PAIN 60 capsule 1     cyclobenzaprine (FLEXERIL) 10 MG tablet Take 0.5-1 tablets (5-10 mg) by mouth 3 times daily as needed for muscle spasms 15 tablet 1     OTC products: none    Allergies   Allergen Reactions     No Known Drug Allergy       Latex Allergy: NO    Social History     Tobacco Use     Smoking status: Never Smoker     Smokeless tobacco: Never Used   Substance Use Topics     Alcohol use: Yes     Alcohol/week: 0.0 oz     Comment: 3x week     History   Drug Use No       REVIEW OF SYSTEMS:   CONSTITUTIONAL: NEGATIVE for fever, chills, change in weight  INTEGUMENTARY/SKIN: NEGATIVE for worrisome rashes, moles or lesions  EYES: NEGATIVE for vision changes or irritation  ENT/MOUTH: NEGATIVE for ear, mouth and throat problems  RESP: NEGATIVE for significant cough or SOB  BREAST: NEGATIVE for masses, tenderness or discharge  CV: NEGATIVE for chest pain, palpitations or peripheral edema  GI: NEGATIVE for nausea, abdominal pain, heartburn, or change in bowel habits  : NEGATIVE for frequency, dysuria, or hematuria  MUSCULOSKELETAL: NEGATIVE for significant arthralgias or myalgia  NEURO: NEGATIVE for weakness, dizziness or paresthesias  ENDOCRINE: NEGATIVE for temperature intolerance, skin/hair changes  HEME: NEGATIVE for bleeding problems  PSYCHIATRIC: NEGATIVE for changes in mood or affect    EXAM:   /85 (BP Location: Left arm, Patient Position: Sitting, Cuff Size: Adult Regular)   Pulse 77   Temp 97.1  F (36.2  C) (Oral)   Resp 14   Ht 1.803 m (5' 11\")   Wt 83 kg (183 lb)   SpO2 97%   BMI 25.52 kg/m      General Appearance: Pleasant, alert, in no acute " respiratory distress.  Head Exam: Normal. Normocephalic, atraumatic. No sinus tenderness.  Eye Exam: Normal external eye, conjunctiva, lids. WHITNEY.  Ear Exam: Normal auditory canals and external ears. Non-tender.  Left TM-normal. Right TM-normal.  OroPharynx Exam: Dental hygiene adequate. Normal buccal mucosa. Normal pharynx.  Neck Exam: Supple, no masses or enlarged, tender nodes.  Thyroid Exam: No nodules or enlargement or tenderness.  Chest/Respiratory Exam: Normal, comfortable, easy respirations. Chest wall normal. Lungs are clear to auscultation. No wheezing, crackles, or rhonchi.  Cardiovascular Exam: Regular rate and rhythm. No murmur, gallop, or rubs. No pedal edema.  Gastrointestinal Exam: Soft, non-tender, no masses or organomegaly.  Musculoskeletal Exam: Back is non-tender, full ROM of upper and lower extremities.  Skin: no rash, warm and dry.    Neurologic Exam: Nonfocal, no tremor. Normal gait.  Psychiatric Exam: Alert - appropriate, normal affect      DIAGNOSTICS:   No labs or EKG required for low risk surgery    Recent Labs   Lab Test 06/25/13  1542 01/14/13   HGB 15.5  --      --    POTASSIUM  --  4.4   CR  --  0.9      IMPRESSION:   Reason for surgery/procedure:   Diagnosis/reason for consult:     The proposed surgical procedure is considered INTERMEDIATE risk.    REVISED CARDIAC RISK INDEX  The patient has the following serious cardiovascular risks for perioperative complications such as (MI, PE, VFib and 3  AV Block):  No serious cardiac risks  INTERPRETATION: 0 risks: Class I (very low risk - 0.4% complication rate)    The patient has the following additional risks for perioperative complications:  No identified additional risks      ICD-10-CM    1. Preop general physical exam Z01.818    2. Sprain of other ligament of right ankle, initial encounter S93.491A        RECOMMENDATIONS:       Cardiovascular Risk  Performs 4 METs exercise without symptoms (Climb a flight of stairs) .     Pulmonary  Risk  No concerns    --Patient is to take all scheduled medications on the day of surgery EXCEPT for modifications listed below.    APPROVAL GIVEN to proceed with proposed procedure, without further diagnostic evaluation       Signed Electronically by: Tong Sage MD    Copy of this evaluation report is provided to requesting physician.    [facility] Preop Guidelines    Revised Cardiac Risk Index

## 2019-09-04 ENCOUNTER — TRANSFERRED RECORDS (OUTPATIENT)
Dept: HEALTH INFORMATION MANAGEMENT | Facility: CLINIC | Age: 34
End: 2019-09-04

## 2019-09-19 ENCOUNTER — VIRTUAL VISIT (OUTPATIENT)
Dept: FAMILY MEDICINE | Facility: CLINIC | Age: 34
End: 2019-09-19
Payer: COMMERCIAL

## 2019-09-19 DIAGNOSIS — M62.830 BACK MUSCLE SPASM: ICD-10-CM

## 2019-09-19 DIAGNOSIS — S29.012S UPPER BACK STRAIN, SEQUELA: ICD-10-CM

## 2019-09-19 DIAGNOSIS — F90.2 ATTENTION DEFICIT HYPERACTIVITY DISORDER (ADHD), COMBINED TYPE: ICD-10-CM

## 2019-09-19 DIAGNOSIS — G47.09 OTHER INSOMNIA: Primary | ICD-10-CM

## 2019-09-19 PROCEDURE — 99442 ZZC PHYSICIAN TELEPHONE EVALUATION 11-20 MIN: CPT | Performed by: FAMILY MEDICINE

## 2019-09-19 RX ORDER — CYCLOBENZAPRINE HCL 10 MG
5-10 TABLET ORAL 3 TIMES DAILY PRN
Qty: 15 TABLET | Refills: 1 | Status: SHIPPED | OUTPATIENT
Start: 2019-09-19 | End: 2020-03-26

## 2019-09-19 RX ORDER — DEXTROAMPHETAMINE SACCHARATE, AMPHETAMINE ASPARTATE, DEXTROAMPHETAMINE SULFATE AND AMPHETAMINE SULFATE 5; 5; 5; 5 MG/1; MG/1; MG/1; MG/1
20 TABLET ORAL 2 TIMES DAILY
Qty: 60 TABLET | Refills: 0 | Status: SHIPPED | OUTPATIENT
Start: 2019-11-01 | End: 2019-12-30

## 2019-09-19 RX ORDER — DEXTROAMPHETAMINE SACCHARATE, AMPHETAMINE ASPARTATE, DEXTROAMPHETAMINE SULFATE AND AMPHETAMINE SULFATE 5; 5; 5; 5 MG/1; MG/1; MG/1; MG/1
20 TABLET ORAL 2 TIMES DAILY
Qty: 60 TABLET | Refills: 0 | Status: SHIPPED | OUTPATIENT
Start: 2019-10-01 | End: 2019-12-30

## 2019-09-19 RX ORDER — DEXTROAMPHETAMINE SACCHARATE, AMPHETAMINE ASPARTATE, DEXTROAMPHETAMINE SULFATE AND AMPHETAMINE SULFATE 5; 5; 5; 5 MG/1; MG/1; MG/1; MG/1
20 TABLET ORAL 2 TIMES DAILY
Qty: 60 TABLET | Refills: 0 | Status: CANCELLED | OUTPATIENT
Start: 2019-09-19

## 2019-09-19 RX ORDER — ZOLPIDEM TARTRATE 5 MG/1
5 TABLET ORAL
Qty: 10 TABLET | Refills: 0 | Status: SHIPPED | OUTPATIENT
Start: 2019-09-19 | End: 2020-06-18

## 2019-09-19 RX ORDER — DEXTROAMPHETAMINE SACCHARATE, AMPHETAMINE ASPARTATE, DEXTROAMPHETAMINE SULFATE AND AMPHETAMINE SULFATE 5; 5; 5; 5 MG/1; MG/1; MG/1; MG/1
20 TABLET ORAL 2 TIMES DAILY
Qty: 60 TABLET | Refills: 0 | Status: SHIPPED | OUTPATIENT
Start: 2019-12-01 | End: 2019-12-30

## 2019-09-19 RX ORDER — CELECOXIB 100 MG/1
CAPSULE ORAL
Qty: 60 CAPSULE | Refills: 1 | Status: SHIPPED | OUTPATIENT
Start: 2019-09-19 | End: 2020-06-18

## 2019-09-19 NOTE — PROGRESS NOTES
"Germán Hunter is a 34 year old male who is being evaluated via a telephone visit.      The patient has been notified of following (by MATTHIAS Guzman     \"We have found that certain health care needs can be provided without the need for a physical exam.  This service lets us provide the care you need with a short phone conversation.  If a prescription is necessary we can send it directly to your pharmacy.  If lab work is needed we can place an order for that and you can then stop by our lab to have the test done at a later time.    This telephone visit will be conducted via 3 way call with the you (the patient) , the physician/provider, and a me all on the line at the same time.  This allows your physician/provider to have the phone conversation with you while I will be taking notes for your medical record.  We will have full access to your [facility] medical record during this entire phone call.    Since this is like an office visit,  will bill your insurance company for this service.  Please check with your medical insurance if this type of telephone/virtual is covered . You may be responsible for the cost of this service if insurance coverage is denied.  The typical cost is $30 (10min), $59(11-20min) and $85 (21-30min)     If during the course of the call the physician/provider feels a telephone visit is not appropriate, you will not be charged for this service\"    Consent has been obtained for this service by care team member: yes.  See the scanned image in the medical record.    Patient reports: medication helping,     Typical side effects of  medication include headache, weakness, dizziness, blurred vision; feeling restless, irritable, or agitated, sleep problems (insomnia); dry mouth or an unpleasant taste in your mouth; diarrhea, constipation, stomach pain, nausea, vomiting; fever; hair loss, loss of appetite, weight loss.    no side effects, no new prescriptions by other physicians    Reviewed [place] " prescription drug registry. No concerning patterns of Rx use in this state, or any other state in the past year that I can find    I have reviewed  chart and have approved a refill of  ADHD medication for the next three months.  I require a face to face visit every 6 months, so asked to plan on scheduling that visit as I will not refill the medication without it.    Patient is also been having some trouble sleeping after his surgery  He is out of any narcotic pain medication has some Celebrex and Flexeril that he is taken in the past and wants a refill on that in addition to having something to help him with sleep sometimes    He has taken zolpidem before and is familiar with its effects    Recommend that he not take this along with narcotic pain relievers or the Flexeril    He will be careful with it    Total phone visit time 12 minutes    Tong Sage MD MPH

## 2019-09-30 ENCOUNTER — TRANSFERRED RECORDS (OUTPATIENT)
Dept: HEALTH INFORMATION MANAGEMENT | Facility: CLINIC | Age: 34
End: 2019-09-30

## 2019-11-15 ENCOUNTER — TRANSFERRED RECORDS (OUTPATIENT)
Dept: HEALTH INFORMATION MANAGEMENT | Facility: CLINIC | Age: 34
End: 2019-11-15

## 2019-12-30 ENCOUNTER — OFFICE VISIT (OUTPATIENT)
Dept: FAMILY MEDICINE | Facility: CLINIC | Age: 34
End: 2019-12-30
Payer: COMMERCIAL

## 2019-12-30 VITALS
HEART RATE: 78 BPM | HEIGHT: 71 IN | TEMPERATURE: 97.1 F | DIASTOLIC BLOOD PRESSURE: 81 MMHG | OXYGEN SATURATION: 97 % | WEIGHT: 184.9 LBS | SYSTOLIC BLOOD PRESSURE: 118 MMHG | BODY MASS INDEX: 25.89 KG/M2

## 2019-12-30 DIAGNOSIS — F90.2 ATTENTION DEFICIT HYPERACTIVITY DISORDER (ADHD), COMBINED TYPE: ICD-10-CM

## 2019-12-30 DIAGNOSIS — Z13.220 LIPID SCREENING: Primary | ICD-10-CM

## 2019-12-30 DIAGNOSIS — Z13.1 DIABETES MELLITUS SCREENING: ICD-10-CM

## 2019-12-30 PROCEDURE — 99213 OFFICE O/P EST LOW 20 MIN: CPT | Performed by: FAMILY MEDICINE

## 2019-12-30 PROCEDURE — 80061 LIPID PANEL: CPT | Performed by: FAMILY MEDICINE

## 2019-12-30 PROCEDURE — 36415 COLL VENOUS BLD VENIPUNCTURE: CPT | Performed by: FAMILY MEDICINE

## 2019-12-30 PROCEDURE — 82947 ASSAY GLUCOSE BLOOD QUANT: CPT | Performed by: FAMILY MEDICINE

## 2019-12-30 RX ORDER — DEXTROAMPHETAMINE SACCHARATE, AMPHETAMINE ASPARTATE, DEXTROAMPHETAMINE SULFATE AND AMPHETAMINE SULFATE 5; 5; 5; 5 MG/1; MG/1; MG/1; MG/1
20 TABLET ORAL 2 TIMES DAILY
Qty: 60 TABLET | Refills: 0 | Status: SHIPPED | OUTPATIENT
Start: 2020-01-30 | End: 2020-03-26

## 2019-12-30 RX ORDER — DEXTROAMPHETAMINE SACCHARATE, AMPHETAMINE ASPARTATE, DEXTROAMPHETAMINE SULFATE AND AMPHETAMINE SULFATE 5; 5; 5; 5 MG/1; MG/1; MG/1; MG/1
20 TABLET ORAL 2 TIMES DAILY
Qty: 60 TABLET | Refills: 0 | Status: SHIPPED | OUTPATIENT
Start: 2020-02-29 | End: 2020-03-26

## 2019-12-30 RX ORDER — DEXTROAMPHETAMINE SACCHARATE, AMPHETAMINE ASPARTATE, DEXTROAMPHETAMINE SULFATE AND AMPHETAMINE SULFATE 5; 5; 5; 5 MG/1; MG/1; MG/1; MG/1
20 TABLET ORAL 2 TIMES DAILY
Qty: 60 TABLET | Refills: 0 | Status: SHIPPED | OUTPATIENT
Start: 2019-12-30 | End: 2020-03-26

## 2019-12-30 ASSESSMENT — MIFFLIN-ST. JEOR: SCORE: 1790.83

## 2019-12-30 NOTE — NURSING NOTE
"Chief Complaint   Patient presents with     Recheck Medication     /81   Pulse 78   Temp 97.1  F (36.2  C) (Oral)   Ht 1.803 m (5' 11\")   Wt 83.9 kg (184 lb 14.4 oz)   SpO2 97%   BMI 25.79 kg/m   Estimated body mass index is 25.79 kg/m  as calculated from the following:    Height as of this encounter: 1.803 m (5' 11\").    Weight as of this encounter: 83.9 kg (184 lb 14.4 oz).  bp completed using cuff size: regular      Health Maintenance addressed:  NONE    n/a    Brittney Ko MA    "

## 2019-12-30 NOTE — PROGRESS NOTES
Subjective     Germán Hunter is a 34 year old male who presents to clinic today for the following health issues:    HPI     Medication Followup of Adderall 20mg     Taking Medication as prescribed: yes    Side Effects:  None    Medication Helping Symptoms:  yes     Has previously been treated for ADHD since he was a kid.    Previously obtained record and this shows that he has been on medication since age 14, stable        Health Maintenance   Topic Date Due     PREVENTIVE CARE VISIT  1983     HIV SCREENING  03/14/1998     LIPID  03/14/2018     DTAP/TDAP/TD IMMUNIZATION (2 - Td) 06/02/2024     PHQ-2  Completed     INFLUENZA VACCINE  Completed     IPV IMMUNIZATION  Aged Out     MENINGITIS IMMUNIZATION  Aged Out       ROS: As per HPI.    Psych: no depression, no concerns about anxiety  Neuro: no new , numbness, or tingling    I have reviewed and updated the patient's past medical history in the EMR. Current problems are:    Patient Active Problem List   Diagnosis     ADHD (attention deficit hyperactivity disorder)     Controlled substance agreement signed     Overweight (BMI 25.0-29.9)     Delayed sleep phase syndrome     Past Surgical History:   Procedure Laterality Date     STICHES/SUTURES CARE  2009    scalp     STICHES/SUTURES CARE  2010    forehead       Social History     Tobacco Use     Smoking status: Never Smoker     Smokeless tobacco: Never Used   Substance Use Topics     Alcohol use: Yes     Alcohol/week: 0.0 standard drinks     Comment: 3x week     Family History   Problem Relation Age of Onset     Hypertension Mother      Hypertension Father      Neurologic Disorder Father         severe migraines     Heart Disease Maternal Grandmother      Colon Cancer Maternal Grandmother      Depression Paternal Grandmother      Diabetes No family hx of      Coronary Artery Disease No family hx of      Hyperlipidemia No family hx of      Cerebrovascular Disease No family hx of      Breast Cancer No family hx of   "    Prostate Cancer No family hx of      Other Cancer No family hx of      Anxiety Disorder No family hx of      Mental Illness No family hx of      Substance Abuse No family hx of      Anesthesia Reaction No family hx of      Asthma No family hx of      Osteoporosis No family hx of      Genetic Disorder No family hx of      Thyroid Disease No family hx of      Obesity No family hx of      Unknown/Adopted No family hx of          Allergies, reviewed:     Allergies   Allergen Reactions     No Known Drug Allergy        Current Outpatient Medications   Medication Sig Dispense Refill     [START ON 2/29/2020] amphetamine-dextroamphetamine (ADDERALL) 20 MG tablet Take 1 tablet (20 mg) by mouth 2 times daily 60 tablet 0     [START ON 1/30/2020] amphetamine-dextroamphetamine (ADDERALL) 20 MG tablet Take 1 tablet (20 mg) by mouth 2 times daily 60 tablet 0     amphetamine-dextroamphetamine (ADDERALL) 20 MG tablet Take 1 tablet (20 mg) by mouth 2 times daily 60 tablet 0     amphetamine-dextroamphetamine (ADDERALL) 20 MG tablet Take 1 tablet (20 mg) by mouth 2 times daily 60 tablet 0     celecoxib (CELEBREX) 100 MG capsule TAKE 1 CAPSULE (100 MG) BY MOUTH 2 TIMES DAILY AS NEEDED FOR MODERATE PAIN 60 capsule 1     cyclobenzaprine (FLEXERIL) 10 MG tablet Take 0.5-1 tablets (5-10 mg) by mouth 3 times daily as needed for muscle spasms 15 tablet 1     zolpidem (AMBIEN) 5 MG tablet Take 1 tablet (5 mg) by mouth nightly as needed for sleep 10 tablet 0       Objective:  /81   Pulse 78   Temp 97.1  F (36.2  C) (Oral)   Ht 1.803 m (5' 11\")   Wt 83.9 kg (184 lb 14.4 oz)   SpO2 97%   BMI 25.79 kg/m    General Appearance: Pleasant, alert, WN/WD in no acute respiratory distress.  Neurologic Exam: Nonfocal, no tremor. Normal gait.  Psychiatric Exam: Alert - appropriate, normal affect    ASSESSMENT/PLAN:    ICD-10-CM    1. Lipid screening Z13.220 Lipid panel reflex to direct LDL Fasting   2. Attention deficit hyperactivity disorder " (ADHD), combined type F90.2 amphetamine-dextroamphetamine (ADDERALL) 20 MG tablet     amphetamine-dextroamphetamine (ADDERALL) 20 MG tablet     amphetamine-dextroamphetamine (ADDERALL) 20 MG tablet   3. Diabetes mellitus screening Z13.1 Glucose     ADD stable, continue medication  Also some screening labs    Follow up: Return as needed if symptoms fail to improve    discussed patient following up with a 3 month E visit or phone visit  And six-month face-to-face visit    Tong Sage MD MPH

## 2019-12-31 LAB
CHOLEST SERPL-MCNC: 178 MG/DL
GLUCOSE SERPL-MCNC: 88 MG/DL (ref 70–99)
HDLC SERPL-MCNC: 37 MG/DL
LDLC SERPL CALC-MCNC: 100 MG/DL
NONHDLC SERPL-MCNC: 141 MG/DL
TRIGL SERPL-MCNC: 205 MG/DL

## 2020-03-02 ENCOUNTER — HEALTH MAINTENANCE LETTER (OUTPATIENT)
Age: 35
End: 2020-03-02

## 2020-03-26 ENCOUNTER — VIRTUAL VISIT (OUTPATIENT)
Dept: FAMILY MEDICINE | Facility: CLINIC | Age: 35
End: 2020-03-26
Payer: COMMERCIAL

## 2020-03-26 DIAGNOSIS — F90.2 ATTENTION DEFICIT HYPERACTIVITY DISORDER (ADHD), COMBINED TYPE: ICD-10-CM

## 2020-03-26 DIAGNOSIS — M62.830 BACK MUSCLE SPASM: ICD-10-CM

## 2020-03-26 DIAGNOSIS — S29.012S UPPER BACK STRAIN, SEQUELA: ICD-10-CM

## 2020-03-26 PROCEDURE — 99441 ZZC PHYSICIAN TELEPHONE EVALUATION 5-10 MIN: CPT | Performed by: FAMILY MEDICINE

## 2020-03-26 RX ORDER — DEXTROAMPHETAMINE SACCHARATE, AMPHETAMINE ASPARTATE, DEXTROAMPHETAMINE SULFATE AND AMPHETAMINE SULFATE 5; 5; 5; 5 MG/1; MG/1; MG/1; MG/1
20 TABLET ORAL 2 TIMES DAILY
Qty: 60 TABLET | Refills: 0 | Status: SHIPPED | OUTPATIENT
Start: 2020-03-26 | End: 2020-12-14

## 2020-03-26 RX ORDER — CYCLOBENZAPRINE HCL 10 MG
5-10 TABLET ORAL 3 TIMES DAILY PRN
Qty: 15 TABLET | Refills: 1 | Status: SHIPPED | OUTPATIENT
Start: 2020-03-26 | End: 2020-06-18

## 2020-03-26 RX ORDER — DEXTROAMPHETAMINE SACCHARATE, AMPHETAMINE ASPARTATE, DEXTROAMPHETAMINE SULFATE AND AMPHETAMINE SULFATE 5; 5; 5; 5 MG/1; MG/1; MG/1; MG/1
20 TABLET ORAL 2 TIMES DAILY
Qty: 60 TABLET | Refills: 0 | Status: SHIPPED | OUTPATIENT
Start: 2020-05-26 | End: 2020-09-17

## 2020-03-26 RX ORDER — DEXTROAMPHETAMINE SACCHARATE, AMPHETAMINE ASPARTATE, DEXTROAMPHETAMINE SULFATE AND AMPHETAMINE SULFATE 5; 5; 5; 5 MG/1; MG/1; MG/1; MG/1
20 TABLET ORAL 2 TIMES DAILY
Qty: 60 TABLET | Refills: 0 | Status: SHIPPED | OUTPATIENT
Start: 2020-04-26 | End: 2020-09-17

## 2020-03-26 NOTE — PROGRESS NOTES
"Germán Hunter is a 35 year old male who is being evaluated via a billable telephone visit.      The patient has been notified of following:     \"This telephone visit will be conducted via a call between you and your physician/provider. We have found that certain health care needs can be provided without the need for a physical exam.  This service lets us provide the care you need with a short phone conversation.  If a prescription is necessary we can send it directly to your pharmacy.  If lab work is needed we can place an order for that and you can then stop by our lab to have the test done at a later time.    If during the course of the call the physician/provider feels a telephone visit is not appropriate, you will not be charged for this service.\"     Germán Hunter complains of   Chief Complaint   Patient presents with     Telephone     Adderall follow up-  20mg tablets BID       I have reviewed and updated the patient's Past Medical History, Social History, Family History and Medication List.    ALLERGIES  No known drug allergy    Medication Followup of Adderall 20mg     Taking Medication as prescribed: yes    Side Effects:  None    Medication Helping Symptoms:  yes     Has previously been treated for ADHD since he was a kid.    Previously obtained record and this shows that he has been on medication since age 14, stable       Assessment/Plan:  1. Attention deficit hyperactivity disorder (ADHD), combined type  stable    2. Back muscle spasm  Also has some ongoing problems with neck and back soreness,   Occasionally uses celebrex and Flexeril    Had an exacerbation recently    3. Upper back strain, sequela        Phone call duration:  5 minutes    Tong Sage MD    "

## 2020-06-18 ENCOUNTER — VIRTUAL VISIT (OUTPATIENT)
Dept: FAMILY MEDICINE | Facility: CLINIC | Age: 35
End: 2020-06-18
Payer: COMMERCIAL

## 2020-06-18 DIAGNOSIS — S29.012S UPPER BACK STRAIN, SEQUELA: ICD-10-CM

## 2020-06-18 DIAGNOSIS — M62.830 BACK MUSCLE SPASM: ICD-10-CM

## 2020-06-18 DIAGNOSIS — G47.09 OTHER INSOMNIA: ICD-10-CM

## 2020-06-18 DIAGNOSIS — F90.2 ATTENTION DEFICIT HYPERACTIVITY DISORDER (ADHD), COMBINED TYPE: ICD-10-CM

## 2020-06-18 PROCEDURE — 99214 OFFICE O/P EST MOD 30 MIN: CPT | Mod: 95 | Performed by: FAMILY MEDICINE

## 2020-06-18 RX ORDER — DEXTROAMPHETAMINE SACCHARATE, AMPHETAMINE ASPARTATE, DEXTROAMPHETAMINE SULFATE AND AMPHETAMINE SULFATE 5; 5; 5; 5 MG/1; MG/1; MG/1; MG/1
20 TABLET ORAL 2 TIMES DAILY
Qty: 60 TABLET | Refills: 0 | Status: CANCELLED | OUTPATIENT
Start: 2020-06-18

## 2020-06-18 RX ORDER — DEXTROAMPHETAMINE SACCHARATE, AMPHETAMINE ASPARTATE, DEXTROAMPHETAMINE SULFATE AND AMPHETAMINE SULFATE 5; 5; 5; 5 MG/1; MG/1; MG/1; MG/1
20 TABLET ORAL 2 TIMES DAILY
Qty: 60 TABLET | Refills: 0 | Status: SHIPPED | OUTPATIENT
Start: 2020-07-27 | End: 2020-08-26

## 2020-06-18 RX ORDER — DEXTROAMPHETAMINE SACCHARATE, AMPHETAMINE ASPARTATE, DEXTROAMPHETAMINE SULFATE AND AMPHETAMINE SULFATE 5; 5; 5; 5 MG/1; MG/1; MG/1; MG/1
20 TABLET ORAL 2 TIMES DAILY
Qty: 60 TABLET | Refills: 0 | Status: SHIPPED | OUTPATIENT
Start: 2020-08-27 | End: 2020-09-17

## 2020-06-18 RX ORDER — ZOLPIDEM TARTRATE 5 MG/1
5 TABLET ORAL
Qty: 10 TABLET | Refills: 0 | Status: SHIPPED | OUTPATIENT
Start: 2020-06-18 | End: 2021-03-16

## 2020-06-18 RX ORDER — DEXTROAMPHETAMINE SACCHARATE, AMPHETAMINE ASPARTATE, DEXTROAMPHETAMINE SULFATE AND AMPHETAMINE SULFATE 5; 5; 5; 5 MG/1; MG/1; MG/1; MG/1
20 TABLET ORAL 2 TIMES DAILY
Qty: 60 TABLET | Refills: 0 | Status: SHIPPED | OUTPATIENT
Start: 2020-06-26 | End: 2020-07-03

## 2020-06-18 RX ORDER — CYCLOBENZAPRINE HCL 10 MG
5-10 TABLET ORAL 3 TIMES DAILY PRN
Qty: 15 TABLET | Refills: 1 | Status: SHIPPED | OUTPATIENT
Start: 2020-06-18 | End: 2020-09-17

## 2020-06-18 RX ORDER — CELECOXIB 100 MG/1
CAPSULE ORAL
Qty: 60 CAPSULE | Refills: 1 | Status: SHIPPED | OUTPATIENT
Start: 2020-06-18 | End: 2020-09-17

## 2020-06-18 NOTE — PROGRESS NOTES
"Germán Hunter is a 35 year old male who is being evaluated via a billable telephone visit.      The patient has been notified of following:     \"This telephone visit will be conducted via a call between you and your physician/provider. We have found that certain health care needs can be provided without the need for a physical exam.  This service lets us provide the care you need with a short phone conversation.  If a prescription is necessary we can send it directly to your pharmacy.  If lab work is needed we can place an order for that and you can then stop by our lab to have the test done at a later time.    Telephone visits are billed at different rates depending on your insurance coverage. During this emergency period, for some insurers they may be billed the same as an in-person visit.  Please reach out to your insurance provider with any questions.    If during the course of the call the physician/provider feels a telephone visit is not appropriate, you will not be charged for this service.\"    Patient has given verbal consent for Telephone visit?  Yes    What phone number would you like to be contacted at? [phone]    How would you like to obtain your AVS? MyChart    Subjective     Germán Hunter is a 35 year old male who presents via phone visit today for the following health issues:    HPI  Medication Followup of adderall    Taking Medication as prescribed: yes    Side Effects:  None    Medication Helping Symptoms:  Yes         Has previously been treated for ADHD since he was a kid.    Previously obtained record and this shows that he has been on medication since age 14, stable    Patient reports: medication helping,       Patient is also been having some trouble sleeping at times  Has some ongoing back pain, which flares up    has some Celebrex and Flexeril that he is taken in the past and wants a refill on that in addition to having something to help him with sleep sometimes        Reported vitals:  " There were no vitals taken for this visit.   healthy, alert and no distress  PSYCH: Alert and oriented times 3; coherent speech, normal   rate and volume, able to articulate logical thoughts, able   to abstract reason, no tangential thoughts, no hallucinations   or delusions  His affect is normal  RESP: No cough, no audible wheezing, able to talk in full sentences  Remainder of exam unable to be completed due to telephone visits    Diagnostic Test Results:  Labs reviewed in Epic        Assessment/Plan:  1. Attention deficit hyperactivity disorder (ADHD), combined type    - amphetamine-dextroamphetamine (ADDERALL) 20 MG tablet; Take 1 tablet (20 mg) by mouth 2 times daily  Dispense: 60 tablet; Refill: 0  - amphetamine-dextroamphetamine (ADDERALL) 20 MG tablet; Take 1 tablet (20 mg) by mouth 2 times daily  Dispense: 60 tablet; Refill: 0  - amphetamine-dextroamphetamine (ADDERALL) 20 MG tablet; Take 1 tablet (20 mg) by mouth 2 times daily  Dispense: 60 tablet; Refill: 0    2. Upper back strain, sequela    - celecoxib (CELEBREX) 100 MG capsule; TAKE 1 CAPSULE (100 MG) BY MOUTH 2 TIMES DAILY AS NEEDED FOR MODERATE PAIN  Dispense: 60 capsule; Refill: 1  - cyclobenzaprine (FLEXERIL) 10 MG tablet; Take 0.5-1 tablets (5-10 mg) by mouth 3 times daily as needed for muscle spasms  Dispense: 15 tablet; Refill: 1    3. Back muscle spasm    - celecoxib (CELEBREX) 100 MG capsule; TAKE 1 CAPSULE (100 MG) BY MOUTH 2 TIMES DAILY AS NEEDED FOR MODERATE PAIN  Dispense: 60 capsule; Refill: 1  - cyclobenzaprine (FLEXERIL) 10 MG tablet; Take 0.5-1 tablets (5-10 mg) by mouth 3 times daily as needed for muscle spasms  Dispense: 15 tablet; Refill: 1    4. Other insomnia    - zolpidem (AMBIEN) 5 MG tablet; Take 1 tablet (5 mg) by mouth nightly as needed for sleep  Dispense: 10 tablet; Refill: 0    No follow-ups on file.      Phone call duration:  7 minutes    Tong Sage MD

## 2020-06-29 ENCOUNTER — TELEPHONE (OUTPATIENT)
Dept: FAMILY MEDICINE | Facility: CLINIC | Age: 35
End: 2020-06-29

## 2020-06-29 NOTE — TELEPHONE ENCOUNTER
Discussed with pt   Pt states [facility] changed their Rx bottles   No longer twist tops   Bottles instead of push tops  Pt states several pills fell into where shifter is in his car  Pt states he'd have to take the car apart to get to the pills  Told pt pills will not be replaced  He has CSA signed  Pt will send pictures to [facility] too   States he's been with  for 6 years and this has never happened but said he understands if he can't get replacement pills  Rebeca SAGASTUME RN

## 2020-06-29 NOTE — TELEPHONE ENCOUNTER
Reason for Call:  Medication or medication refill:    Do you use a [facility]?  Name of the pharmacy and phone number for the current request:     [facility] [id number] [facility] - [place], [place] - [address]    Name of the medication requested: amphetamine-dextroamphetamine (ADDERALL) 20 MG tablet     Other request: Pt opened Rx bottle in car and it spilled all over, including into a crevice that he cannot reach. He has photos, but is requesting 17 replacements to make up for the ones in the crevice     Can we leave a detailed message on this number? YES    Phone number patient can be reached at: Cell number on file:    Telephone Information:   Mobile [phone]     Best Time: any    Call taken on 6/29/2020 at 1:19 PM by Kami Warren

## 2020-07-02 ENCOUNTER — MYC MEDICAL ADVICE (OUTPATIENT)
Dept: FAMILY MEDICINE | Facility: CLINIC | Age: 35
End: 2020-07-02

## 2020-07-02 DIAGNOSIS — F90.2 ATTENTION DEFICIT HYPERACTIVITY DISORDER (ADHD), COMBINED TYPE: ICD-10-CM

## 2020-07-03 RX ORDER — DEXTROAMPHETAMINE SACCHARATE, AMPHETAMINE ASPARTATE, DEXTROAMPHETAMINE SULFATE AND AMPHETAMINE SULFATE 5; 5; 5; 5 MG/1; MG/1; MG/1; MG/1
20 TABLET ORAL 2 TIMES DAILY
Qty: 17 TABLET | Refills: 0 | Status: SHIPPED | OUTPATIENT
Start: 2020-07-03 | End: 2021-06-21

## 2020-08-13 ENCOUNTER — TRANSFERRED RECORDS (OUTPATIENT)
Dept: HEALTH INFORMATION MANAGEMENT | Facility: CLINIC | Age: 35
End: 2020-08-13

## 2020-08-21 ENCOUNTER — TELEPHONE (OUTPATIENT)
Dept: FAMILY MEDICINE | Facility: CLINIC | Age: 35
End: 2020-08-21

## 2020-08-21 NOTE — TELEPHONE ENCOUNTER
Attempt to call pharmacy again,  No answer put on hold again for several minutes  Autumn Almeida RN

## 2020-08-21 NOTE — TELEPHONE ENCOUNTER
Adderall  Next Rx dated 8/27  : refilled  7/24/2020 for #60    Called pharmacy to see why patient wants early refill.  On hold x 7 minutes.  Autumn Almeida RN

## 2020-08-21 NOTE — TELEPHONE ENCOUNTER
Reason for Call:  Medication or medication refill:    Do you use a [facility]?  Name of the pharmacy and phone number for the current request:       [facility] [id number] [place] [facility] - [place], [place] - [address]      Name of the medication requested: pharmacy called. Pt is requesting early fill on this. 1-2 days early would be sufficient but earlier is requested    Other request:   Can we leave a detailed message on this number?   yes  Phone number patient can be reached at: [phone]    Best Time: any    Call taken on 8/21/2020 at 12:17 PM by Edy Mcneal

## 2020-09-10 ENCOUNTER — TRANSFERRED RECORDS (OUTPATIENT)
Dept: HEALTH INFORMATION MANAGEMENT | Facility: CLINIC | Age: 35
End: 2020-09-10

## 2020-09-17 ENCOUNTER — VIRTUAL VISIT (OUTPATIENT)
Dept: FAMILY MEDICINE | Facility: CLINIC | Age: 35
End: 2020-09-17
Payer: COMMERCIAL

## 2020-09-17 DIAGNOSIS — F90.2 ATTENTION DEFICIT HYPERACTIVITY DISORDER (ADHD), COMBINED TYPE: ICD-10-CM

## 2020-09-17 DIAGNOSIS — M62.830 BACK MUSCLE SPASM: ICD-10-CM

## 2020-09-17 DIAGNOSIS — S29.012S UPPER BACK STRAIN, SEQUELA: ICD-10-CM

## 2020-09-17 PROCEDURE — 99213 OFFICE O/P EST LOW 20 MIN: CPT | Mod: 95 | Performed by: FAMILY MEDICINE

## 2020-09-17 RX ORDER — CYCLOBENZAPRINE HCL 10 MG
5-10 TABLET ORAL 3 TIMES DAILY PRN
Qty: 15 TABLET | Refills: 1 | Status: SHIPPED | OUTPATIENT
Start: 2020-09-17 | End: 2020-12-14

## 2020-09-17 RX ORDER — DEXTROAMPHETAMINE SACCHARATE, AMPHETAMINE ASPARTATE, DEXTROAMPHETAMINE SULFATE AND AMPHETAMINE SULFATE 5; 5; 5; 5 MG/1; MG/1; MG/1; MG/1
20 TABLET ORAL 2 TIMES DAILY
Qty: 60 TABLET | Refills: 0 | Status: SHIPPED | OUTPATIENT
Start: 2020-11-26 | End: 2020-11-24

## 2020-09-17 RX ORDER — CELECOXIB 100 MG/1
CAPSULE ORAL
Qty: 60 CAPSULE | Refills: 1 | Status: SHIPPED | OUTPATIENT
Start: 2020-09-17 | End: 2021-06-21

## 2020-09-17 RX ORDER — DEXTROAMPHETAMINE SACCHARATE, AMPHETAMINE ASPARTATE, DEXTROAMPHETAMINE SULFATE AND AMPHETAMINE SULFATE 5; 5; 5; 5 MG/1; MG/1; MG/1; MG/1
20 TABLET ORAL 2 TIMES DAILY
Qty: 60 TABLET | Refills: 0 | Status: SHIPPED | OUTPATIENT
Start: 2020-09-26 | End: 2020-12-14

## 2020-09-17 RX ORDER — DEXTROAMPHETAMINE SACCHARATE, AMPHETAMINE ASPARTATE, DEXTROAMPHETAMINE SULFATE AND AMPHETAMINE SULFATE 5; 5; 5; 5 MG/1; MG/1; MG/1; MG/1
20 TABLET ORAL 2 TIMES DAILY
Qty: 60 TABLET | Refills: 0 | Status: SHIPPED | OUTPATIENT
Start: 2020-10-26 | End: 2020-12-14

## 2020-09-17 NOTE — PROGRESS NOTES
"Germán Hunter is a 35 year old male who is being evaluated via a billable video visit.      The patient has been notified of following:     \"This video visit will be conducted via a call between you and your physician/provider. We have found that certain health care needs can be provided without the need for an in-person physical exam.  This service lets us provide the care you need with a video conversation.  If a prescription is necessary we can send it directly to your pharmacy.  If lab work is needed we can place an order for that and you can then stop by our lab to have the test done at a later time.    Video visits are billed at different rates depending on your insurance coverage.  Please reach out to your insurance provider with any questions.    If during the course of the call the physician/provider feels a video visit is not appropriate, you will not be charged for this service.\"    Patient has given verbal consent for Video visit? Yes  How would you like to obtain your AVS? MyChart  If you are dropped from the video visit, the video invite should be resent to:   Will anyone else be joining your video visit? No    Subjective     Germán Hunter is a 35 year old male who presents today via video visit for the following health issues:    HPI    Medication Followup of Adderall     Taking Medication as prescribed: yes    Side Effects:  None    Medication Helping Symptoms:  yes       Has previously been treated for ADHD since he was a kid.    Previously obtained record and this shows that he has been on medication since age 14, stable    Also get some neck and back pain, some Rx medications for those    Video Start Time: 12:00        Review of Systems   CONSTITUTIONAL: NEGATIVE for fever, chills, change in weight      Objective           Vitals:  No vitals were obtained today due to virtual visit.    Physical Exam     GENERAL: Healthy, alert and no distress  EYES: Eyes grossly normal to inspection.  No discharge " or erythema, or obvious scleral/conjunctival abnormalities.  RESP: No audible wheeze, cough, or visible cyanosis.  No visible retractions or increased work of breathing.    SKIN: Visible skin clear. No significant rash, abnormal pigmentation or lesions.  NEURO: Cranial nerves grossly intact.  Mentation and speech appropriate for age.  PSYCH: Mentation appears normal, affect normal/bright, judgement and insight intact, normal speech and appearance well-groomed.          Assessment & Plan   1. Attention deficit hyperactivity disorder (ADHD), combined type    Stable    - amphetamine-dextroamphetamine (ADDERALL) 20 MG tablet; Take 1 tablet (20 mg) by mouth 2 times daily  Dispense: 60 tablet; Refill: 0  - amphetamine-dextroamphetamine (ADDERALL) 20 MG tablet; Take 1 tablet (20 mg) by mouth 2 times daily  Dispense: 60 tablet; Refill: 0  - amphetamine-dextroamphetamine (ADDERALL) 20 MG tablet; Take 1 tablet (20 mg) by mouth 2 times daily  Dispense: 60 tablet; Refill: 0    2. Back muscle spasm  stable  - cyclobenzaprine (FLEXERIL) 10 MG tablet; Take 0.5-1 tablets (5-10 mg) by mouth 3 times daily as needed for muscle spasms  Dispense: 15 tablet; Refill: 1  - celecoxib (CELEBREX) 100 MG capsule; TAKE 1 CAPSULE (100 MG) BY MOUTH 2 TIMES DAILY AS NEEDED FOR MODERATE PAIN  Dispense: 60 capsule; Refill: 1    3. Upper back strain, sequela  stable  - cyclobenzaprine (FLEXERIL) 10 MG tablet; Take 0.5-1 tablets (5-10 mg) by mouth 3 times daily as needed for muscle spasms  Dispense: 15 tablet; Refill: 1  - celecoxib (CELEBREX) 100 MG capsule; TAKE 1 CAPSULE (100 MG) BY MOUTH 2 TIMES DAILY AS NEEDED FOR MODERATE PAIN  Dispense: 60 capsule; Refill: 1        Tong Sage MD  [facility]      Video-Visit Details    Type of service:  Video Visit    Video End Time:12:05pm    Originating Location (pt. Location): Home    Distant Location (provider location):  [facility]     Platform used for Video Visit:  Doximity

## 2020-09-17 NOTE — PROGRESS NOTES
"Germán Hunter is a 35 year old male who is being evaluated via a billable telephone visit.      The patient has been notified of following:     \"This telephone visit will be conducted via a call between you and your physician/provider. We have found that certain health care needs can be provided without the need for a physical exam.  This service lets us provide the care you need with a short phone conversation.  If a prescription is necessary we can send it directly to your pharmacy.  If lab work is needed we can place an order for that and you can then stop by our lab to have the test done at a later time.    Telephone visits are billed at different rates depending on your insurance coverage. During this emergency period, for some insurers they may be billed the same as an in-person visit.  Please reach out to your insurance provider with any questions.    If during the course of the call the physician/provider feels a telephone visit is not appropriate, you will not be charged for this service.\"    Patient has given verbal consent for Telephone visit?  {YES-NO  Default Yes:4444::\"Yes\"}    What phone number would you like to be contacted at? ***    How would you like to obtain your AVS? {AVS Preference:766520}    Subjective     Germán Hunter is a 35 year old male who presents via phone visit today for the following health issues:    HPI    Medication Followup of ***    Taking Medication as prescribed: {.:896398::\"yes\"}    Side Effects:  {NONEORCHOOSE:521539::\"None\"}    Medication Helping Symptoms:  {.:246431::\"yes\"}       Review of Systems   {ROS COMP (Optional):246754}       Objective          Vitals:  No vitals were obtained today due to virtual visit.    {GENERAL APPEARANCE:50::\"healthy\",\"alert\",\"no distress\"}  PSYCH: Alert and oriented times 3; coherent speech, normal   rate and volume, able to articulate logical thoughts, able   to abstract reason, no tangential thoughts, no hallucinations   or delusions  His " "affect is { :4660004::\"normal\"}  RESP: No cough, no audible wheezing, able to talk in full sentences  Remainder of exam unable to be completed due to telephone visits    {Diagnostic Test Results (Optional):778557}        Assessment/Plan:    {PROVIDER CHARTING PREFERENCE SOAPO:007334}    Phone call duration:  *** minutes                "

## 2020-11-23 ENCOUNTER — MYC MEDICAL ADVICE (OUTPATIENT)
Dept: FAMILY MEDICINE | Facility: CLINIC | Age: 35
End: 2020-11-23

## 2020-11-23 DIAGNOSIS — F90.2 ATTENTION DEFICIT HYPERACTIVITY DISORDER (ADHD), COMBINED TYPE: ICD-10-CM

## 2020-11-24 RX ORDER — DEXTROAMPHETAMINE SACCHARATE, AMPHETAMINE ASPARTATE, DEXTROAMPHETAMINE SULFATE AND AMPHETAMINE SULFATE 5; 5; 5; 5 MG/1; MG/1; MG/1; MG/1
20 TABLET ORAL 2 TIMES DAILY
Qty: 60 TABLET | Refills: 0 | Status: SHIPPED | OUTPATIENT
Start: 2020-11-25 | End: 2020-12-14

## 2020-11-24 NOTE — TELEPHONE ENCOUNTER
"JF,   Called pharmacy - they no longer allow verbal okays    Rx is \"locked\" in their system - they cannot manually override the date from 11/26/2020 to 11/25/2020  They need new Rx sent dated 11/25  Pended  Please authorize if appropriate.  Thanks,  Rebeca SAGASTUME RN      "

## 2020-12-14 ENCOUNTER — VIRTUAL VISIT (OUTPATIENT)
Dept: FAMILY MEDICINE | Facility: CLINIC | Age: 35
End: 2020-12-14
Payer: COMMERCIAL

## 2020-12-14 DIAGNOSIS — S29.012S UPPER BACK STRAIN, SEQUELA: ICD-10-CM

## 2020-12-14 DIAGNOSIS — F90.2 ATTENTION DEFICIT HYPERACTIVITY DISORDER (ADHD), COMBINED TYPE: ICD-10-CM

## 2020-12-14 DIAGNOSIS — M62.830 BACK MUSCLE SPASM: ICD-10-CM

## 2020-12-14 PROCEDURE — 99214 OFFICE O/P EST MOD 30 MIN: CPT | Mod: 95 | Performed by: FAMILY MEDICINE

## 2020-12-14 RX ORDER — DEXTROAMPHETAMINE SACCHARATE, AMPHETAMINE ASPARTATE, DEXTROAMPHETAMINE SULFATE AND AMPHETAMINE SULFATE 5; 5; 5; 5 MG/1; MG/1; MG/1; MG/1
20 TABLET ORAL 2 TIMES DAILY
Qty: 60 TABLET | Refills: 0 | Status: SHIPPED | OUTPATIENT
Start: 2020-12-21 | End: 2021-01-20

## 2020-12-14 RX ORDER — DEXTROAMPHETAMINE SACCHARATE, AMPHETAMINE ASPARTATE, DEXTROAMPHETAMINE SULFATE AND AMPHETAMINE SULFATE 5; 5; 5; 5 MG/1; MG/1; MG/1; MG/1
20 TABLET ORAL 2 TIMES DAILY
Qty: 60 TABLET | Refills: 0 | Status: SHIPPED | OUTPATIENT
Start: 2021-01-21 | End: 2021-02-20

## 2020-12-14 RX ORDER — DEXTROAMPHETAMINE SACCHARATE, AMPHETAMINE ASPARTATE, DEXTROAMPHETAMINE SULFATE AND AMPHETAMINE SULFATE 5; 5; 5; 5 MG/1; MG/1; MG/1; MG/1
20 TABLET ORAL 2 TIMES DAILY
Qty: 60 TABLET | Refills: 0 | Status: SHIPPED | OUTPATIENT
Start: 2021-02-21 | End: 2021-03-23

## 2020-12-14 RX ORDER — CYCLOBENZAPRINE HCL 10 MG
5-10 TABLET ORAL 3 TIMES DAILY PRN
Qty: 15 TABLET | Refills: 1 | Status: SHIPPED | OUTPATIENT
Start: 2020-12-14 | End: 2021-03-16

## 2020-12-14 NOTE — PROGRESS NOTES
"Germán Hunter is a 35 year old male who is being evaluated via a billable telephone visit.      The patient has been notified of following:     \"This telephone visit will be conducted via a call between you and your physician/provider. We have found that certain health care needs can be provided without the need for a physical exam.  This service lets us provide the care you need with a short phone conversation.  If a prescription is necessary we can send it directly to your pharmacy.  If lab work is needed we can place an order for that and you can then stop by our lab to have the test done at a later time.    Telephone visits are billed at different rates depending on your insurance coverage. During this emergency period, for some insurers they may be billed the same as an in-person visit.  Please reach out to your insurance provider with any questions.    If during the course of the call the physician/provider feels a telephone visit is not appropriate, you will not be charged for this service.\"    Patient has given verbal consent for Telephone visit?  Yes    What phone number would you like to be contacted at? [phone]    How would you like to obtain your AVS? MyChart    Subjective     Germán Hunter is a 35 year old male who presents via phone visit today for the following health issues:    HPI       ADHD Follow-Up  Patient is leaving the early 22nd of December would like script to be filled on 12/21/2020     Has previously been treated for ADHD since he was a kid.    Previously obtained record and this shows that he has been on medication since age 14, stable    Also get some neck and back pain, some Rx medications for those  Date of last ADHD office visit: 09/17/2020  Status since last visit: Stable  Taking controlled (daily) medications as prescribed: Yes                       Parent/Patient Concerns with Medications: None  ADHD Medication     Amphetamines Disp Start End     amphetamine-dextroamphetamine " (ADDERALL) 20 MG tablet    60 tablet 11/25/2020     Sig - Route: Take 1 tablet (20 mg) by mouth 2 times daily - Oral    Class: E-Prescribe    Earliest Fill Date: 11/25/2020     amphetamine-dextroamphetamine (ADDERALL) 20 MG tablet    60 tablet 10/26/2020     Sig - Route: Take 1 tablet (20 mg) by mouth 2 times daily - Oral    Class: E-Prescribe    Earliest Fill Date: 10/26/2020    Prior authorization: Closed - Prior Authorization not required for patient/medication     amphetamine-dextroamphetamine (ADDERALL) 20 MG tablet    60 tablet 9/26/2020     Sig - Route: Take 1 tablet (20 mg) by mouth 2 times daily - Oral    Class: E-Prescribe    Earliest Fill Date: 9/26/2020    Prior authorization: Closed - Prior Authorization not required for patient/medication     amphetamine-dextroamphetamine (ADDERALL) 20 MG tablet    17 tablet 7/3/2020     Sig - Route: Take 1 tablet (20 mg) by mouth 2 times daily For lost/damaged Rx - Oral    Class: E-Prescribe    Earliest Fill Date: 7/3/2020     amphetamine-dextroamphetamine (ADDERALL) 20 MG tablet    60 tablet 3/26/2020     Sig - Route: Take 1 tablet (20 mg) by mouth 2 times daily - Oral    Class: E-Prescribe    Earliest Fill Date: 3/26/2020    Prior authorization: Closed - Prior Authorization not required for patient/medication              Sleep: no problems  Home/Family Concerns: None  Peer Concerns: None    Review of Systems   Constitutional, HEENT, cardiovascular, pulmonary, GI, , musculoskeletal, neuro, skin, endocrine and psych systems are negative, except as otherwise noted.       Objective          Vitals:  No vitals were obtained today due to virtual visit.    healthy, alert and no distress  PSYCH: Alert and oriented times 3; coherent speech, normal   rate and volume, able to articulate logical thoughts, able   to abstract reason, no tangential thoughts, no hallucinations   or delusions  His affect is normal  RESP: No cough, no audible wheezing, able to talk in full  "sentences  Remainder of exam unable to be completed due to telephone visits          Assessment/Plan:    Assessment & Plan     Attention deficit hyperactivity disorder (ADHD), combined type    - amphetamine-dextroamphetamine (ADDERALL) 20 MG tablet; Take 1 tablet (20 mg) by mouth 2 times daily  - amphetamine-dextroamphetamine (ADDERALL) 20 MG tablet; Take 1 tablet (20 mg) by mouth 2 times daily  - amphetamine-dextroamphetamine (ADDERALL) 20 MG tablet; Take 1 tablet (20 mg) by mouth 2 times daily    Back muscle spasm    - cyclobenzaprine (FLEXERIL) 10 MG tablet; Take 0.5-1 tablets (5-10 mg) by mouth 3 times daily as needed for muscle spasms    Upper back strain, sequela    - cyclobenzaprine (FLEXERIL) 10 MG tablet; Take 0.5-1 tablets (5-10 mg) by mouth 3 times daily as needed for muscle spasms     BMI:   Estimated body mass index is 25.79 kg/m  as calculated from the following:    Height as of 12/30/19: 1.803 m (5' 11\").    Weight as of 12/30/19: 83.9 kg (184 lb 14.4 oz).            No follow-ups on file.    Tong Sage MD  [facility]    Phone call duration:  7 minutes                "

## 2020-12-20 ENCOUNTER — HEALTH MAINTENANCE LETTER (OUTPATIENT)
Age: 35
End: 2020-12-20

## 2020-12-22 ENCOUNTER — TELEPHONE (OUTPATIENT)
Dept: FAMILY MEDICINE | Facility: CLINIC | Age: 35
End: 2020-12-22

## 2020-12-22 NOTE — TELEPHONE ENCOUNTER
NELA,    [facility] called.  Patient asking for early refill of Adderall, states he is going out of town.    Last refill was 11/25 -  Per pharmacy and .    Asking if ok to refill tomorrow 12/23.      Triage can call and let them know if ok.    Autumn Almeida RN

## 2021-03-16 ENCOUNTER — VIRTUAL VISIT (OUTPATIENT)
Dept: FAMILY MEDICINE | Facility: CLINIC | Age: 36
End: 2021-03-16
Payer: COMMERCIAL

## 2021-03-16 DIAGNOSIS — G47.09 OTHER INSOMNIA: ICD-10-CM

## 2021-03-16 DIAGNOSIS — S29.012S UPPER BACK STRAIN, SEQUELA: ICD-10-CM

## 2021-03-16 DIAGNOSIS — M62.830 BACK MUSCLE SPASM: ICD-10-CM

## 2021-03-16 DIAGNOSIS — F90.2 ATTENTION DEFICIT HYPERACTIVITY DISORDER (ADHD), COMBINED TYPE: Primary | ICD-10-CM

## 2021-03-16 PROCEDURE — 99214 OFFICE O/P EST MOD 30 MIN: CPT | Mod: 95 | Performed by: FAMILY MEDICINE

## 2021-03-16 RX ORDER — DEXTROAMPHETAMINE SACCHARATE, AMPHETAMINE ASPARTATE, DEXTROAMPHETAMINE SULFATE AND AMPHETAMINE SULFATE 5; 5; 5; 5 MG/1; MG/1; MG/1; MG/1
20 TABLET ORAL 2 TIMES DAILY
Qty: 60 TABLET | Refills: 0 | Status: SHIPPED | OUTPATIENT
Start: 2021-03-23 | End: 2021-04-22

## 2021-03-16 RX ORDER — ZOLPIDEM TARTRATE 5 MG/1
5 TABLET ORAL
Qty: 10 TABLET | Refills: 0 | Status: SHIPPED | OUTPATIENT
Start: 2021-03-16 | End: 2021-06-21

## 2021-03-16 RX ORDER — DEXTROAMPHETAMINE SACCHARATE, AMPHETAMINE ASPARTATE, DEXTROAMPHETAMINE SULFATE AND AMPHETAMINE SULFATE 5; 5; 5; 5 MG/1; MG/1; MG/1; MG/1
20 TABLET ORAL 2 TIMES DAILY
Qty: 60 TABLET | Refills: 0 | Status: SHIPPED | OUTPATIENT
Start: 2021-04-23 | End: 2021-05-23

## 2021-03-16 RX ORDER — DEXTROAMPHETAMINE SACCHARATE, AMPHETAMINE ASPARTATE, DEXTROAMPHETAMINE SULFATE AND AMPHETAMINE SULFATE 5; 5; 5; 5 MG/1; MG/1; MG/1; MG/1
20 TABLET ORAL 2 TIMES DAILY
Qty: 60 TABLET | Refills: 0 | Status: SHIPPED | OUTPATIENT
Start: 2021-05-24 | End: 2021-06-23

## 2021-03-16 RX ORDER — CYCLOBENZAPRINE HCL 10 MG
5-10 TABLET ORAL 3 TIMES DAILY PRN
Qty: 15 TABLET | Refills: 1 | Status: SHIPPED | OUTPATIENT
Start: 2021-03-16 | End: 2021-06-21

## 2021-03-16 NOTE — PROGRESS NOTES
Germán is a 36 year old who is being evaluated via a billable video visit.      How would you like to obtain your AVS? MyChart  If the video visit is dropped, the invitation should be resent by: Text to cell phone: [phone]  Will anyone else be joining your video visit? No    Video Start Time: 12:04 PM    Assessment & Plan     Attention deficit hyperactivity disorder (ADHD), combined type  Stable    Reviewed [place] prescription drug registry. No concerning patterns of Rx use in this state in the past year that I can find    - amphetamine-dextroamphetamine (ADDERALL) 20 MG tablet; Take 1 tablet (20 mg) by mouth 2 times daily  - amphetamine-dextroamphetamine (ADDERALL) 20 MG tablet; Take 1 tablet (20 mg) by mouth 2 times daily  - amphetamine-dextroamphetamine (ADDERALL) 20 MG tablet; Take 1 tablet (20 mg) by mouth 2 times daily    Back muscle spasm  stable  - cyclobenzaprine (FLEXERIL) 10 MG tablet; Take 0.5-1 tablets (5-10 mg) by mouth 3 times daily as needed for muscle spasms    Upper back strain, sequela  Stable  - cyclobenzaprine (FLEXERIL) 10 MG tablet; Take 0.5-1 tablets (5-10 mg) by mouth 3 times daily as needed for muscle spasms    Other insomnia  stable  - zolpidem (AMBIEN) 5 MG tablet; Take 1 tablet (5 mg) by mouth nightly as needed for sleep    No follow-ups on file.    Tong Sage MD  [facility]   Germán is a 36 year old who presents for the following health issues     HPI     Medication Followup of adderall, flexeril, zolpidem    Taking Medication as prescribed: yes    Side Effects:  None    Medication Helping Symptoms:  yes   ADHD Follow-Up    Date of last ADHD office visit: 12/14/2020  Status since last visit: Stable  Taking controlled (daily) medications as prescribed: Yes                       Parent/Patient Concerns with Medications: None  ADHD Medication     Amphetamines Disp Start End     amphetamine-dextroamphetamine (ADDERALL) 20 MG tablet    60  tablet 2/21/2021 3/23/2021    Sig - Route: Take 1 tablet (20 mg) by mouth 2 times daily - Oral    Class: E-Prescribe    Earliest Fill Date: 2/21/2021    Prior authorization: Closed - Prior Authorization not required for patient/medication     amphetamine-dextroamphetamine (ADDERALL) 20 MG tablet    17 tablet 7/3/2020     Sig - Route: Take 1 tablet (20 mg) by mouth 2 times daily For lost/damaged Rx - Oral    Class: E-Prescribe    Earliest Fill Date: 7/3/2020        Medication Benefits:   Controlled symptoms: Hyperactivity - motor restlessness, Attention span and Distractability      Medication side effects:  Side effects noted: none  Denies: appetite suppression, weight loss and insomnia    Review of Systems   Constitutional, HEENT, cardiovascular, pulmonary, GI, , musculoskeletal, neuro, skin, endocrine and psych systems are negative, except as otherwise noted.      Objective           Vitals:  No vitals were obtained today due to virtual visit.    Physical Exam   GENERAL: Healthy, alert and no distress  EYES: Eyes grossly normal to inspection.  No discharge or erythema, or obvious scleral/conjunctival abnormalities.  RESP: No audible wheeze, cough, or visible cyanosis.  No visible retractions or increased work of breathing.    SKIN: Visible skin clear. No significant rash, abnormal pigmentation or lesions.  NEURO: Cranial nerves grossly intact.  Mentation and speech appropriate for age.  PSYCH: Mentation appears normal, affect normal/bright, judgement and insight intact, normal speech and appearance well-groomed.                Video-Visit Details    Type of service:  Video Visit    Video End Time:12:19 PM    Originating Location (pt. Location): Home    Distant Location (provider location):  [facility]     Platform used for Video Visit: sciencebite

## 2021-04-18 ENCOUNTER — HEALTH MAINTENANCE LETTER (OUTPATIENT)
Age: 36
End: 2021-04-18

## 2021-05-28 NOTE — NURSING NOTE
"Chief Complaint   Patient presents with     Recheck Medication     Adderall follow up     /76  Pulse 84  Temp 97.8  F (36.6  C) (Oral)  Resp 17  Ht 5' 11\" (1.803 m)  Wt 182 lb 6.4 oz (82.7 kg)  SpO2 97%  BMI 25.44 kg/m2 Estimated body mass index is 25.44 kg/(m^2) as calculated from the following:    Height as of this encounter: 5' 11\" (1.803 m).    Weight as of this encounter: 182 lb 6.4 oz (82.7 kg).  bp completed using cuff size: large  right       Health Maintenance Due Pending Provider Review:  NONE    n/a    Magalys Helm MA  [facility]  " solids show

## 2021-06-21 ENCOUNTER — OFFICE VISIT (OUTPATIENT)
Dept: FAMILY MEDICINE | Facility: CLINIC | Age: 36
End: 2021-06-21
Payer: COMMERCIAL

## 2021-06-21 VITALS
BODY MASS INDEX: 26.8 KG/M2 | HEIGHT: 71 IN | RESPIRATION RATE: 22 BRPM | SYSTOLIC BLOOD PRESSURE: 140 MMHG | OXYGEN SATURATION: 98 % | HEART RATE: 95 BPM | DIASTOLIC BLOOD PRESSURE: 94 MMHG | WEIGHT: 191.4 LBS | TEMPERATURE: 96.7 F

## 2021-06-21 DIAGNOSIS — M62.830 BACK MUSCLE SPASM: ICD-10-CM

## 2021-06-21 DIAGNOSIS — F90.2 ATTENTION DEFICIT HYPERACTIVITY DISORDER (ADHD), COMBINED TYPE: Primary | ICD-10-CM

## 2021-06-21 DIAGNOSIS — G43.709 CHRONIC MIGRAINE WITHOUT AURA WITHOUT STATUS MIGRAINOSUS, NOT INTRACTABLE: ICD-10-CM

## 2021-06-21 DIAGNOSIS — G47.21 DELAYED SLEEP PHASE SYNDROME: ICD-10-CM

## 2021-06-21 DIAGNOSIS — S29.012S UPPER BACK STRAIN, SEQUELA: ICD-10-CM

## 2021-06-21 DIAGNOSIS — G47.09 OTHER INSOMNIA: ICD-10-CM

## 2021-06-21 PROCEDURE — 99214 OFFICE O/P EST MOD 30 MIN: CPT | Performed by: FAMILY MEDICINE

## 2021-06-21 RX ORDER — ZOLPIDEM TARTRATE 5 MG/1
5 TABLET ORAL
Qty: 10 TABLET | Refills: 0 | Status: SHIPPED | OUTPATIENT
Start: 2021-06-21 | End: 2021-09-20

## 2021-06-21 RX ORDER — DEXTROAMPHETAMINE SACCHARATE, AMPHETAMINE ASPARTATE, DEXTROAMPHETAMINE SULFATE AND AMPHETAMINE SULFATE 5; 5; 5; 5 MG/1; MG/1; MG/1; MG/1
20 TABLET ORAL 2 TIMES DAILY
Qty: 60 TABLET | Refills: 0 | Status: SHIPPED | OUTPATIENT
Start: 2021-08-22 | End: 2021-09-21

## 2021-06-21 RX ORDER — CYCLOBENZAPRINE HCL 10 MG
5-10 TABLET ORAL 3 TIMES DAILY PRN
Qty: 15 TABLET | Refills: 1 | Status: SHIPPED | OUTPATIENT
Start: 2021-06-21 | End: 2021-09-20

## 2021-06-21 RX ORDER — DEXTROAMPHETAMINE SACCHARATE, AMPHETAMINE ASPARTATE, DEXTROAMPHETAMINE SULFATE AND AMPHETAMINE SULFATE 5; 5; 5; 5 MG/1; MG/1; MG/1; MG/1
20 TABLET ORAL 2 TIMES DAILY
Qty: 60 TABLET | Refills: 0 | Status: SHIPPED | OUTPATIENT
Start: 2021-06-21 | End: 2021-07-21

## 2021-06-21 RX ORDER — DEXTROAMPHETAMINE SACCHARATE, AMPHETAMINE ASPARTATE, DEXTROAMPHETAMINE SULFATE AND AMPHETAMINE SULFATE 5; 5; 5; 5 MG/1; MG/1; MG/1; MG/1
20 TABLET ORAL 2 TIMES DAILY
Qty: 60 TABLET | Refills: 0 | Status: SHIPPED | OUTPATIENT
Start: 2021-07-22 | End: 2021-08-21

## 2021-06-21 RX ORDER — RIZATRIPTAN BENZOATE 5 MG/1
5 TABLET, ORALLY DISINTEGRATING ORAL
Qty: 10 TABLET | Refills: 3 | Status: SHIPPED | OUTPATIENT
Start: 2021-06-21 | End: 2021-09-20

## 2021-06-21 RX ORDER — CELECOXIB 100 MG/1
CAPSULE ORAL
Qty: 60 CAPSULE | Refills: 1 | Status: SHIPPED | OUTPATIENT
Start: 2021-06-21

## 2021-06-21 ASSESSMENT — MIFFLIN-ST. JEOR: SCORE: 1810.31

## 2021-06-21 NOTE — PROGRESS NOTES
Assessment & Plan     Upper back strain, sequela  Stable   - celecoxib (CELEBREX) 100 MG capsule; TAKE 1 CAPSULE (100 MG) BY MOUTH 2 TIMES DAILY AS NEEDED FOR MODERATE PAIN  - cyclobenzaprine (FLEXERIL) 10 MG tablet; Take 0.5-1 tablets (5-10 mg) by mouth 3 times daily as needed for muscle spasms    Back muscle spasm  stable  - celecoxib (CELEBREX) 100 MG capsule; TAKE 1 CAPSULE (100 MG) BY MOUTH 2 TIMES DAILY AS NEEDED FOR MODERATE PAIN  - cyclobenzaprine (FLEXERIL) 10 MG tablet; Take 0.5-1 tablets (5-10 mg) by mouth 3 times daily as needed for muscle spasms    Other insomnia  stable  - zolpidem (AMBIEN) 5 MG tablet; Take 1 tablet (5 mg) by mouth nightly as needed for sleep    Attention deficit hyperactivity disorder (ADHD), combined type  stable  - amphetamine-dextroamphetamine (ADDERALL) 20 MG tablet; Take 1 tablet (20 mg) by mouth 2 times daily  - amphetamine-dextroamphetamine (ADDERALL) 20 MG tablet; Take 1 tablet (20 mg) by mouth 2 times daily  - amphetamine-dextroamphetamine (ADDERALL) 20 MG tablet; Take 1 tablet (20 mg) by mouth 2 times daily    Delayed sleep phase syndrome    - zolpidem (AMBIEN) 5 MG tablet; Take 1 tablet (5 mg) by mouth nightly as needed for sleep    Chronic migraine without aura without status migrainosus, not intractable  New medication    - rizatriptan (MAXALT-MLT) 5 MG ODT; Take 1 tablet (5 mg) by mouth at onset of headache for migraine May repeat in 2 hours. Max 6 tablets/24 hours.    No follow-ups on file.    Tong Sage MD  [facility]      Raffaele Dunlap is a 36 year old who presents for the following health issues   HPI     Medication Followup of adderall 20 mg BID, zolpidem 5 mg, celecoxib 100 mg, cyclobenzaprine 10 mg    Taking Medication as prescribed: yes    Side Effects:  None    Medication Helping Symptoms:  yes   ADHD Follow-Up    Date of last ADHD office visit: 3/16/21 VV  Status since last visit: Stable  Taking controlled (daily)  "medications as prescribed: Yes                       Parent/Patient Concerns with Medications: None  ADHD Medication     Amphetamines Disp Start End     amphetamine-dextroamphetamine (ADDERALL) 20 MG tablet    60 tablet 5/24/2021 6/23/2021    Sig - Route: Take 1 tablet (20 mg) by mouth 2 times daily - Oral    Class: E-Prescribe    Earliest Fill Date: 5/21/2021    Prior authorization: Closed - Prior Authorization duplicate/in process     amphetamine-dextroamphetamine (ADDERALL) 20 MG tablet    17 tablet 7/3/2020     Sig - Route: Take 1 tablet (20 mg) by mouth 2 times daily For lost/damaged Rx - Oral    Class: E-Prescribe    Earliest Fill Date: 7/3/2020          Some back and neck pain, problems, meds that are helpful for that prn    Also migraines, a bit more frequently recently, dad takes maxalt, wonders about this      Review of Systems   Constitutional, HEENT, cardiovascular, pulmonary, GI, , musculoskeletal, neuro, skin, endocrine and psych systems are negative, except as otherwise noted.      Objective    BP (!) 140/94 (Patient Position: Sitting, Cuff Size: Adult Regular)   Pulse 95   Temp 96.7  F (35.9  C) (Tympanic)   Resp 22   Ht 1.803 m (5' 11\")   Wt 86.8 kg (191 lb 6.4 oz)   SpO2 98%   BMI 26.69 kg/m    Body mass index is 26.69 kg/m .  Physical Exam     General Appearance: Pleasant, alert, in no acute respiratory distress.  Head Exam: Normal. Normocephalic, atraumatic. No sinus tenderness.  Skin: no rash, warm and dry.    Neurologic Exam: Nonfocal, no tremor. Normal gait.  Psychiatric Exam: Alert - appropriate, normal affect            "

## 2021-09-20 ENCOUNTER — VIRTUAL VISIT (OUTPATIENT)
Dept: FAMILY MEDICINE | Facility: CLINIC | Age: 36
End: 2021-09-20
Payer: COMMERCIAL

## 2021-09-20 DIAGNOSIS — G47.21 DELAYED SLEEP PHASE SYNDROME: ICD-10-CM

## 2021-09-20 DIAGNOSIS — G47.09 OTHER INSOMNIA: ICD-10-CM

## 2021-09-20 DIAGNOSIS — F90.2 ATTENTION DEFICIT HYPERACTIVITY DISORDER (ADHD), COMBINED TYPE: Primary | ICD-10-CM

## 2021-09-20 DIAGNOSIS — S29.012S UPPER BACK STRAIN, SEQUELA: ICD-10-CM

## 2021-09-20 DIAGNOSIS — M62.830 BACK MUSCLE SPASM: ICD-10-CM

## 2021-09-20 DIAGNOSIS — G43.709 CHRONIC MIGRAINE WITHOUT AURA WITHOUT STATUS MIGRAINOSUS, NOT INTRACTABLE: ICD-10-CM

## 2021-09-20 PROCEDURE — 99213 OFFICE O/P EST LOW 20 MIN: CPT | Mod: 95 | Performed by: PHYSICIAN ASSISTANT

## 2021-09-20 RX ORDER — DEXTROAMPHETAMINE SACCHARATE, AMPHETAMINE ASPARTATE, DEXTROAMPHETAMINE SULFATE AND AMPHETAMINE SULFATE 5; 5; 5; 5 MG/1; MG/1; MG/1; MG/1
20 TABLET ORAL DAILY
Qty: 30 TABLET | Refills: 0 | Status: SHIPPED | OUTPATIENT
Start: 2021-09-20 | End: 2021-09-21

## 2021-09-20 RX ORDER — ZOLPIDEM TARTRATE 5 MG/1
5 TABLET ORAL
Qty: 10 TABLET | Refills: 0 | Status: SHIPPED | OUTPATIENT
Start: 2021-09-20 | End: 2021-12-13

## 2021-09-20 RX ORDER — RIZATRIPTAN BENZOATE 5 MG/1
5 TABLET, ORALLY DISINTEGRATING ORAL
Qty: 12 TABLET | Refills: 3 | Status: SHIPPED | OUTPATIENT
Start: 2021-09-20 | End: 2022-06-08

## 2021-09-20 RX ORDER — DEXTROAMPHETAMINE SACCHARATE, AMPHETAMINE ASPARTATE, DEXTROAMPHETAMINE SULFATE AND AMPHETAMINE SULFATE 5; 5; 5; 5 MG/1; MG/1; MG/1; MG/1
20 TABLET ORAL DAILY
Qty: 30 TABLET | Refills: 0 | Status: SHIPPED | OUTPATIENT
Start: 2021-11-21 | End: 2021-09-21

## 2021-09-20 RX ORDER — DEXTROAMPHETAMINE SACCHARATE, AMPHETAMINE ASPARTATE, DEXTROAMPHETAMINE SULFATE AND AMPHETAMINE SULFATE 5; 5; 5; 5 MG/1; MG/1; MG/1; MG/1
20 TABLET ORAL DAILY
Qty: 30 TABLET | Refills: 0 | Status: SHIPPED | OUTPATIENT
Start: 2021-10-21 | End: 2021-09-21

## 2021-09-20 RX ORDER — CYCLOBENZAPRINE HCL 10 MG
5-10 TABLET ORAL 3 TIMES DAILY PRN
Qty: 15 TABLET | Refills: 1 | Status: SHIPPED | OUTPATIENT
Start: 2021-09-20 | End: 2021-12-13

## 2021-09-20 NOTE — PROGRESS NOTES
".Germán is a 36 year old who is being evaluated via a billable video visit.      How would you like to obtain your AVS? MyChart  If the video visit is dropped, the invitation should be resent by: Text to cell phone: [phone]  Will anyone else be joining your video visit? No    Video Start Time: 3:39 PM    Assessment & Plan     Chronic migraine without aura without status migrainosus, not intractable    - rizatriptan (MAXALT-MLT) 5 MG ODT; Take 1 tablet (5 mg) by mouth at onset of headache for migraine May repeat in 2 hours. Max 6 tablets/24 hours.    Other insomnia    - zolpidem (AMBIEN) 5 MG tablet; Take 1 tablet (5 mg) by mouth nightly as needed for sleep    Delayed sleep phase syndrome    - zolpidem (AMBIEN) 5 MG tablet; Take 1 tablet (5 mg) by mouth nightly as needed for sleep    Back muscle spasm    - cyclobenzaprine (FLEXERIL) 10 MG tablet; Take 0.5-1 tablets (5-10 mg) by mouth 3 times daily as needed for muscle spasms    Upper back strain, sequela    - cyclobenzaprine (FLEXERIL) 10 MG tablet; Take 0.5-1 tablets (5-10 mg) by mouth 3 times daily as needed for muscle spasms    Attention deficit hyperactivity disorder (ADHD), combined type  Refilled adderall               BMI:   Estimated body mass index is 26.69 kg/m  as calculated from the following:    Height as of 6/21/21: 1.803 m (5' 11\").    Weight as of 6/21/21: 86.8 kg (191 lb 6.4 oz).           Return in about 3 months (around 12/20/2021).    JANET [facility]   Germán is a 36 year old who presents for the following health issues     HPI     Medication Followup of Adderall    Taking Medication as prescribed: yes    Side Effects:  None    Medication Helping Symptoms:  yes      ** Pt also requested refills on flexeril, rizatriptan and Ambien   Migraine     Since your last clinic visit, how have your headaches changed?  No change    How often are you getting headaches or migraines? rare     Are you " able to do normal daily activities when you have a migraine? Yes    Are you taking rescue/relief medications? (Select all that apply) Maxalt    How helpful is your rescue/relief medication?  I get total relief    Are you taking any medications to prevent migraines? (Select all that apply)  No    In the past 4 weeks, how often have you gone to urgent care or the emergency room because of your headaches?  0      Review of Systems   Constitutional, HEENT, cardiovascular, pulmonary, gi and gu systems are negative, except as otherwise noted.      Objective           Vitals:  No vitals were obtained today due to virtual visit.    Physical Exam   GENERAL: alert and no distress  EYES: Eyes grossly normal to inspection.  No discharge or erythema, or obvious scleral/conjunctival abnormalities.  RESP: No audible wheeze, cough, or visible cyanosis.  No visible retractions or increased work of breathing.    SKIN: Visible skin clear. No significant rash, abnormal pigmentation or lesions.  NEURO: Cranial nerves grossly intact.  Mentation and speech appropriate for age.  PSYCH: Mentation appears normal, affect normal/bright, judgement and insight intact, normal speech and appearance well-groomed.                Video-Visit Details    Type of service:  Video Visit    Video End Time:3:47 PM    Originating Location (pt. Location): Home    Distant Location (provider location):  [facility]     Platform used for Video Visit: [employer]

## 2021-10-03 ENCOUNTER — HEALTH MAINTENANCE LETTER (OUTPATIENT)
Age: 36
End: 2021-10-03

## 2021-12-10 NOTE — PROGRESS NOTES
Germán is a 36 year old who is being evaluated via a billable video visit.      How would you like to obtain your AVS? Aniyah  If the video visit is dropped, the invitation should be resent by: Aniyah or else Text to cell phone: [phone]  Will anyone else be joining your video visit? No    Video Start Time: 11:42 AM    Assessment & Plan     Attention deficit hyperactivity disorder (ADHD), combined type  stable  - amphetamine-dextroamphetamine (ADDERALL) 20 MG tablet; Take 1 tablet (20 mg) by mouth 2 times daily  - amphetamine-dextroamphetamine (ADDERALL) 20 MG tablet; Take 1 tablet (20 mg) by mouth 2 times daily  - amphetamine-dextroamphetamine (ADDERALL) 20 MG tablet; Take 1 tablet (20 mg) by mouth 2 times daily    Back muscle spasm  Re occuring  - cyclobenzaprine (FLEXERIL) 10 MG tablet; Take 0.5-1 tablets (5-10 mg) by mouth 3 times daily as needed for muscle spasms    Upper back strain, sequela    - cyclobenzaprine (FLEXERIL) 10 MG tablet; Take 0.5-1 tablets (5-10 mg) by mouth 3 times daily as needed for muscle spasms    Other insomnia  Discussed potential for impairment and dependence with benzo/Z medications, a limited Rx prescribed    - zolpidem (AMBIEN) 5 MG tablet; Take 1 tablet (5 mg) by mouth nightly as needed for sleep    Delayed sleep phase syndrome    - zolpidem (AMBIEN) 5 MG tablet; Take 1 tablet (5 mg) by mouth nightly as needed for sleep    No follow-ups on file.    Tong Sage MD  [facility]   Germán is a 36 year old who presents for the following health issues    History of Present Illness      He is taking medications regularly.       Medication Followup of adderall, flexeril, maxalt, ambien    Taking Medication as prescribed: yes    Side Effects:  None    Medication Helping Symptoms:  yes      Migraine     Since your last clinic visit, how have your headaches changed?  Worsened - had recent breakup, migraines worsened since then d/t  stress    How often are you getting headaches or migraines? Once a week or every 2 weeks     Are you able to do normal daily activities when you have a migraine? No    Are you taking rescue/relief medications? (Select all that apply) Maxalt    How helpful is your rescue/relief medication?  I get total relief    Are you taking any medications to prevent migraines? (Select all that apply)  No    In the past 4 weeks, how often have you gone to urgent care or the emergency room because of your headaches?  0    ADHD Follow-Up    Date of last ADHD office visit: 9/20/21 VV  Status since last visit: stable  Taking controlled (daily) medications as prescribed: yes                       Parent/Patient Concerns with Medications: none  ADHD Medication     Amphetamines Disp Start End     amphetamine-dextroamphetamine (ADDERALL) 20 MG tablet    60 tablet 9/21/2021     Sig - Route: Take 1 tablet (20 mg) by mouth 2 times daily - Oral    Class: E-Prescribe    Earliest Fill Date: 9/21/2021    Prior authorization: Closed - Prior Authorization duplicate/in process     amphetamine-dextroamphetamine (ADDERALL) 20 MG tablet    60 tablet 10/20/2021     Sig - Route: Take 1 tablet (20 mg) by mouth 2 times daily - Oral    Class: E-Prescribe    Earliest Fill Date: 10/20/2021    Prior authorization: Closed - Prior Authorization duplicate/in process     amphetamine-dextroamphetamine (ADDERALL) 20 MG tablet    60 tablet 11/19/2021     Sig - Route: Take 1 tablet (20 mg) by mouth 2 times daily - Oral    Class: E-Prescribe    Earliest Fill Date: 11/19/2021    Prior authorization: Closed - Prior Authorization duplicate/in process        Medication Benefits:   Controlled symptoms: Attention span and Distractability  Uncontrolled Symptoms: None    Medication side effects:  Side effects noted: none      Review of Systems   Constitutional, HEENT, cardiovascular, pulmonary, gi and gu systems are negative, except as otherwise noted.      Objective            Vitals:  No vitals were obtained today due to virtual visit.    Physical Exam   GENERAL: Healthy, alert and no distress  EYES: Eyes grossly normal to inspection.  No discharge or erythema, or obvious scleral/conjunctival abnormalities.  RESP: No audible wheeze, cough, or visible cyanosis.  No visible retractions or increased work of breathing.    SKIN: Visible skin clear. No significant rash, abnormal pigmentation or lesions.  NEURO: Cranial nerves grossly intact.  Mentation and speech appropriate for age.  PSYCH: Mentation appears normal, affect normal/bright, judgement and insight intact, normal speech and appearance well-groomed.          Video-Visit Details    Type of service:  Video Visit    Video End Time:11:55 AM    Originating Location (pt. Location): Home    Distant Location (provider location):  [facility]     Platform used for Video Visit: Etherios

## 2021-12-13 ENCOUNTER — VIRTUAL VISIT (OUTPATIENT)
Dept: FAMILY MEDICINE | Facility: CLINIC | Age: 36
End: 2021-12-13
Payer: COMMERCIAL

## 2021-12-13 DIAGNOSIS — M62.830 BACK MUSCLE SPASM: ICD-10-CM

## 2021-12-13 DIAGNOSIS — S29.012S UPPER BACK STRAIN, SEQUELA: ICD-10-CM

## 2021-12-13 DIAGNOSIS — G47.21 DELAYED SLEEP PHASE SYNDROME: ICD-10-CM

## 2021-12-13 DIAGNOSIS — F90.2 ATTENTION DEFICIT HYPERACTIVITY DISORDER (ADHD), COMBINED TYPE: ICD-10-CM

## 2021-12-13 DIAGNOSIS — G47.09 OTHER INSOMNIA: ICD-10-CM

## 2021-12-13 PROCEDURE — 99214 OFFICE O/P EST MOD 30 MIN: CPT | Mod: 95 | Performed by: FAMILY MEDICINE

## 2021-12-13 RX ORDER — ZOLPIDEM TARTRATE 5 MG/1
5 TABLET ORAL
Qty: 10 TABLET | Refills: 1 | Status: SHIPPED | OUTPATIENT
Start: 2021-12-13 | End: 2022-06-08

## 2021-12-13 RX ORDER — DEXTROAMPHETAMINE SACCHARATE, AMPHETAMINE ASPARTATE, DEXTROAMPHETAMINE SULFATE AND AMPHETAMINE SULFATE 5; 5; 5; 5 MG/1; MG/1; MG/1; MG/1
20 TABLET ORAL 2 TIMES DAILY
Qty: 60 TABLET | Refills: 0 | Status: SHIPPED | OUTPATIENT
Start: 2022-01-17 | End: 2022-08-22

## 2021-12-13 RX ORDER — CYCLOBENZAPRINE HCL 10 MG
5-10 TABLET ORAL 3 TIMES DAILY PRN
Qty: 15 TABLET | Refills: 1 | Status: SHIPPED | OUTPATIENT
Start: 2021-12-13 | End: 2022-03-11

## 2021-12-13 RX ORDER — DEXTROAMPHETAMINE SACCHARATE, AMPHETAMINE ASPARTATE, DEXTROAMPHETAMINE SULFATE AND AMPHETAMINE SULFATE 5; 5; 5; 5 MG/1; MG/1; MG/1; MG/1
20 TABLET ORAL 2 TIMES DAILY
Qty: 60 TABLET | Refills: 0 | Status: SHIPPED | OUTPATIENT
Start: 2021-12-17

## 2022-03-10 NOTE — PROGRESS NOTES
"  Assessment & Plan     Screen for STD (sexually transmitted disease)  Pt requesting STD screening   Currently asymptomatic   No prior infections   - Treponema Abs w Reflex to RPR and Titer; Future  - NEISSERIA GONORRHOEA PCR  - CHLAMYDIA TRACHOMATIS PCR  - Hepatitis B Surface Antibody; Future  - Comprehensive metabolic panel (BMP + Alb, Alk Phos, ALT, AST, Total. Bili, TP); Future    Attention deficit hyperactivity disorder (ADHD), combined type  ADHD - tested age 14   On 20mg BID -   Stable  F/u in 3 months   - Comprehensive metabolic panel (BMP + Alb, Alk Phos, ALT, AST, Total. Bili, TP); Future    Migraine without aura and without status migrainosus, not intractable   migraine stable and controlled with maxalt - he has refills on the prescriptions  - Comprehensive metabolic panel (BMP + Alb, Alk Phos, ALT, AST, Total. Bili, TP); Future    Back muscle spasm       Screening for HIV (human immunodeficiency virus)     - HIV Antigen Antibody Combo; Future    Need for hepatitis C screening test     - Hepatitis C Screen Reflex to HCV RNA Quant and Genotype; Future    Upper back strain, sequela          Delayed sleep phase syndrome   uses Ambien prn -   Last filled in December and he is out - he does have one refill on the Rx  He will fill and can reassess at next visit                 BMI:   Estimated body mass index is 23.74 kg/m  as calculated from the following:    Height as of 6/21/21: 1.803 m (5' 11\").    Weight as of this encounter: 77.2 kg (170 lb 3.2 oz).           No follow-ups on file.    Clare Anaya DO  [facility]   Germán is a 36 year old who presents for the following health issues     History of Present Illness       Reason for visit:  Med check and std test    He eats 2-3 servings of fruits and vegetables daily.He consumes 1 sweetened beverage(s) daily.He exercises with enough effort to increase his heart rate 30 to 60 minutes per day.  He exercises with enough " effort to increase his heart rate 4 days per week.   He is taking medications regularly.     ADHD Follow-Up    Date of last ADHD office visit: 12/13/2021  Status since last visit: Stable  Taking controlled (daily) medications as prescribed: Yes                       Parent/Patient Concerns with Medications: None  ADHD Medication     Amphetamines Disp Start End     amphetamine-dextroamphetamine (ADDERALL) 20 MG tablet    60 tablet 1/17/2022     Sig - Route: Take 1 tablet (20 mg) by mouth 2 times daily - Oral    Class: E-Prescribe    Earliest Fill Date: 1/17/2022    Prior authorization: Closed - Prior Authorization not required for patient/medication     amphetamine-dextroamphetamine (ADDERALL) 20 MG tablet    60 tablet 1/17/2022     Sig - Route: Take 1 tablet (20 mg) by mouth 2 times daily - Oral    Class: E-Prescribe    Earliest Fill Date: 1/17/2022    Prior authorization: Closed - Prior Authorization duplicate/in process     amphetamine-dextroamphetamine (ADDERALL) 20 MG tablet    60 tablet 12/17/2021     Sig - Route: Take 1 tablet (20 mg) by mouth 2 times daily - Oral    Class: E-Prescribe    Earliest Fill Date: 12/17/2021    Prior authorization: Closed - Prior Authorization duplicate/in process          Migraines -         Sleep: at times .  Home/Family Concerns:   Peer Concerns:       Medication side effects:  Side effects noted: none      Interested in STD test -   Asymptomatic    Insomnia -   Uses one every 2 weeks - or approx 2 per month                  Review of Systems   Constitutional, HEENT, cardiovascular, pulmonary, gi and gu systems are negative, except as otherwise noted.      Objective    /86   Pulse 82   Temp 96.8  F (36  C) (Temporal)   Wt 77.2 kg (170 lb 3.2 oz)   SpO2 99%   BMI 23.74 kg/m    Body mass index is 23.74 kg/m .  Physical Exam   GENERAL: healthy, alert and no distress  RESP: lungs clear to auscultation - no rales, rhonchi or wheezes  CV: regular rate and rhythm, normal S1  S2, no S3 or S4, no murmur, click or rub, no peripheral edema and peripheral pulses strong

## 2022-03-11 ENCOUNTER — OFFICE VISIT (OUTPATIENT)
Dept: FAMILY MEDICINE | Facility: CLINIC | Age: 37
End: 2022-03-11
Payer: COMMERCIAL

## 2022-03-11 VITALS
SYSTOLIC BLOOD PRESSURE: 133 MMHG | OXYGEN SATURATION: 99 % | BODY MASS INDEX: 23.74 KG/M2 | HEART RATE: 82 BPM | WEIGHT: 170.2 LBS | DIASTOLIC BLOOD PRESSURE: 86 MMHG | TEMPERATURE: 96.8 F

## 2022-03-11 DIAGNOSIS — G43.009 MIGRAINE WITHOUT AURA AND WITHOUT STATUS MIGRAINOSUS, NOT INTRACTABLE: ICD-10-CM

## 2022-03-11 DIAGNOSIS — F90.2 ATTENTION DEFICIT HYPERACTIVITY DISORDER (ADHD), COMBINED TYPE: ICD-10-CM

## 2022-03-11 DIAGNOSIS — Z11.4 SCREENING FOR HIV (HUMAN IMMUNODEFICIENCY VIRUS): ICD-10-CM

## 2022-03-11 DIAGNOSIS — Z11.3 SCREEN FOR STD (SEXUALLY TRANSMITTED DISEASE): Primary | ICD-10-CM

## 2022-03-11 DIAGNOSIS — M62.830 BACK MUSCLE SPASM: ICD-10-CM

## 2022-03-11 DIAGNOSIS — S29.012S UPPER BACK STRAIN, SEQUELA: ICD-10-CM

## 2022-03-11 DIAGNOSIS — F51.04 PSYCHOPHYSIOLOGICAL INSOMNIA: ICD-10-CM

## 2022-03-11 DIAGNOSIS — G47.21 DELAYED SLEEP PHASE SYNDROME: ICD-10-CM

## 2022-03-11 DIAGNOSIS — Z11.59 NEED FOR HEPATITIS C SCREENING TEST: ICD-10-CM

## 2022-03-11 PROCEDURE — 80053 COMPREHEN METABOLIC PANEL: CPT | Performed by: FAMILY MEDICINE

## 2022-03-11 PROCEDURE — 36415 COLL VENOUS BLD VENIPUNCTURE: CPT | Performed by: FAMILY MEDICINE

## 2022-03-11 PROCEDURE — 86706 HEP B SURFACE ANTIBODY: CPT | Performed by: FAMILY MEDICINE

## 2022-03-11 PROCEDURE — 87389 HIV-1 AG W/HIV-1&-2 AB AG IA: CPT | Performed by: FAMILY MEDICINE

## 2022-03-11 PROCEDURE — 87491 CHLMYD TRACH DNA AMP PROBE: CPT | Performed by: FAMILY MEDICINE

## 2022-03-11 PROCEDURE — 86803 HEPATITIS C AB TEST: CPT | Performed by: FAMILY MEDICINE

## 2022-03-11 PROCEDURE — 87591 N.GONORRHOEAE DNA AMP PROB: CPT | Performed by: FAMILY MEDICINE

## 2022-03-11 PROCEDURE — 86780 TREPONEMA PALLIDUM: CPT | Performed by: FAMILY MEDICINE

## 2022-03-11 PROCEDURE — 99214 OFFICE O/P EST MOD 30 MIN: CPT | Performed by: FAMILY MEDICINE

## 2022-03-11 RX ORDER — CYCLOBENZAPRINE HCL 10 MG
5-10 TABLET ORAL 3 TIMES DAILY PRN
Qty: 15 TABLET | Refills: 1 | Status: SHIPPED | OUTPATIENT
Start: 2022-03-11 | End: 2022-06-08

## 2022-03-11 RX ORDER — DEXTROAMPHETAMINE SACCHARATE, AMPHETAMINE ASPARTATE, DEXTROAMPHETAMINE SULFATE AND AMPHETAMINE SULFATE 5; 5; 5; 5 MG/1; MG/1; MG/1; MG/1
20 TABLET ORAL 2 TIMES DAILY
Qty: 60 TABLET | Refills: 0 | Status: SHIPPED | OUTPATIENT
Start: 2022-04-11 | End: 2022-05-24

## 2022-03-11 RX ORDER — DEXTROAMPHETAMINE SACCHARATE, AMPHETAMINE ASPARTATE, DEXTROAMPHETAMINE SULFATE AND AMPHETAMINE SULFATE 5; 5; 5; 5 MG/1; MG/1; MG/1; MG/1
20 TABLET ORAL 2 TIMES DAILY
Qty: 60 TABLET | Refills: 0 | Status: SHIPPED | OUTPATIENT
Start: 2022-03-11 | End: 2022-04-10

## 2022-03-11 RX ORDER — DEXTROAMPHETAMINE SACCHARATE, AMPHETAMINE ASPARTATE, DEXTROAMPHETAMINE SULFATE AND AMPHETAMINE SULFATE 5; 5; 5; 5 MG/1; MG/1; MG/1; MG/1
20 TABLET ORAL DAILY
Qty: 60 TABLET | Refills: 0 | Status: SHIPPED | OUTPATIENT
Start: 2022-05-12 | End: 2022-06-11

## 2022-03-12 LAB
ALBUMIN SERPL-MCNC: 3.8 G/DL (ref 3.4–5)
ALP SERPL-CCNC: 73 U/L (ref 40–150)
ALT SERPL W P-5'-P-CCNC: 33 U/L (ref 0–70)
ANION GAP SERPL CALCULATED.3IONS-SCNC: 6 MMOL/L (ref 3–14)
AST SERPL W P-5'-P-CCNC: 13 U/L (ref 0–45)
BILIRUB SERPL-MCNC: 0.5 MG/DL (ref 0.2–1.3)
BUN SERPL-MCNC: 13 MG/DL (ref 7–30)
C TRACH DNA SPEC QL NAA+PROBE: NEGATIVE
CALCIUM SERPL-MCNC: 8.8 MG/DL (ref 8.5–10.1)
CHLORIDE BLD-SCNC: 107 MMOL/L (ref 94–109)
CO2 SERPL-SCNC: 27 MMOL/L (ref 20–32)
CREAT SERPL-MCNC: 0.96 MG/DL (ref 0.66–1.25)
GFR SERPL CREATININE-BSD FRML MDRD: >90 ML/MIN/1.73M2
GLUCOSE BLD-MCNC: 89 MG/DL (ref 70–99)
N GONORRHOEA DNA SPEC QL NAA+PROBE: NEGATIVE
POTASSIUM BLD-SCNC: 4 MMOL/L (ref 3.4–5.3)
PROT SERPL-MCNC: 7.2 G/DL (ref 6.8–8.8)
SODIUM SERPL-SCNC: 140 MMOL/L (ref 133–144)
T PALLIDUM AB SER QL: NONREACTIVE

## 2022-03-14 LAB
HBV SURFACE AB SERPL IA-ACNC: 0 M[IU]/ML
HCV AB SERPL QL IA: NONREACTIVE
HIV 1+2 AB+HIV1 P24 AG SERPL QL IA: NONREACTIVE

## 2022-03-18 NOTE — RESULT ENCOUNTER NOTE
Dear Germán,   Your test results are all back -   -All of your labs are normal.  Your hep B test shows you are not immune.  Consider getting the 3 vaccine series to protect yourself.  Let us know if you have any questions.  -Clare Anaya, DO

## 2022-04-14 ENCOUNTER — OFFICE VISIT (OUTPATIENT)
Dept: FAMILY MEDICINE | Facility: CLINIC | Age: 37
End: 2022-04-14
Payer: COMMERCIAL

## 2022-04-14 VITALS
HEART RATE: 97 BPM | OXYGEN SATURATION: 99 % | WEIGHT: 171 LBS | RESPIRATION RATE: 16 BRPM | BODY MASS INDEX: 23.94 KG/M2 | DIASTOLIC BLOOD PRESSURE: 87 MMHG | SYSTOLIC BLOOD PRESSURE: 133 MMHG | TEMPERATURE: 97.9 F | HEIGHT: 71 IN

## 2022-04-14 DIAGNOSIS — Z23 NEED FOR VACCINATION: ICD-10-CM

## 2022-04-14 DIAGNOSIS — Z11.3 ROUTINE SCREENING FOR STI (SEXUALLY TRANSMITTED INFECTION): Primary | ICD-10-CM

## 2022-04-14 PROCEDURE — 86780 TREPONEMA PALLIDUM: CPT | Performed by: INTERNAL MEDICINE

## 2022-04-14 PROCEDURE — 99213 OFFICE O/P EST LOW 20 MIN: CPT | Mod: 25 | Performed by: INTERNAL MEDICINE

## 2022-04-14 PROCEDURE — 90471 IMMUNIZATION ADMIN: CPT | Performed by: INTERNAL MEDICINE

## 2022-04-14 PROCEDURE — 87389 HIV-1 AG W/HIV-1&-2 AB AG IA: CPT | Performed by: INTERNAL MEDICINE

## 2022-04-14 PROCEDURE — 86803 HEPATITIS C AB TEST: CPT | Performed by: INTERNAL MEDICINE

## 2022-04-14 PROCEDURE — 87591 N.GONORRHOEAE DNA AMP PROB: CPT | Performed by: INTERNAL MEDICINE

## 2022-04-14 PROCEDURE — 87491 CHLMYD TRACH DNA AMP PROBE: CPT | Performed by: INTERNAL MEDICINE

## 2022-04-14 PROCEDURE — 90746 HEPB VACCINE 3 DOSE ADULT IM: CPT | Performed by: INTERNAL MEDICINE

## 2022-04-14 PROCEDURE — 36415 COLL VENOUS BLD VENIPUNCTURE: CPT | Performed by: INTERNAL MEDICINE

## 2022-04-14 ASSESSMENT — PAIN SCALES - GENERAL: PAINLEVEL: NO PAIN (0)

## 2022-04-14 NOTE — LETTER
April 15, 2022      Germán HAZEL [place]  [address] [facility] [address]        Dear ,    We are writing to inform you of your test results.    The following letter pertains to your most recent diagnostic tests:     HIV and hepatitis C tests are negative.       Resulted Orders   NEISSERIA GONORRHOEA PCR   Result Value Ref Range    Neisseria gonorrhoeae Negative Negative      Comment:      Negative for N. gonorrhoeae rRNA by transcription mediated amplification. A negative result by transcription mediated amplification does not preclude the presence of C. trachomatis infection because results are dependent on proper and adequate collection, absence of inhibitors and sufficient rRNA to be detected.   CHLAMYDIA TRACHOMATIS PCR   Result Value Ref Range    Chlamydia trachomatis Negative Negative      Comment:      A negative result by transcription mediated amplification does not preclude the presence of C. trachomatis infection because results are dependent on proper and adequate collection, absence of inhibitors and sufficient rRNA to be detected.   Treponema Abs w Reflex to RPR and Titer   Result Value Ref Range    Treponema Antibody Total Nonreactive Nonreactive   HIV Antigen Antibody Combo   Result Value Ref Range    HIV Antigen Antibody Combo Nonreactive Nonreactive      Comment:      HIV-1 p24 Ag & HIV-1/HIV-2 Ab Not Detected   Hepatitis C Screen Reflex to HCV RNA Quant and Genotype   Result Value Ref Range    Hepatitis C Antibody Nonreactive Nonreactive    Narrative    Assay performance characteristics have not been established for newborns, infants, and children.       If you have any questions or concerns, please call the clinic at the number listed above.       Sincerely,      Yevgeniy Adame MD

## 2022-04-14 NOTE — LETTER
April 15, 2022      Germán HAZ[place]  [address] [facility] [address]        Dear ,    We are writing to inform you of your test results.    Syphilis, gonorrhea and chlamydia tests are negative.       Resulted Orders   NEISSERIA GONORRHOEA PCR   Result Value Ref Range    Neisseria gonorrhoeae Negative Negative      Comment:      Negative for N. gonorrhoeae rRNA by transcription mediated amplification. A negative result by transcription mediated amplification does not preclude the presence of C. trachomatis infection because results are dependent on proper and adequate collection, absence of inhibitors and sufficient rRNA to be detected.   CHLAMYDIA TRACHOMATIS PCR   Result Value Ref Range    Chlamydia trachomatis Negative Negative      Comment:      A negative result by transcription mediated amplification does not preclude the presence of C. trachomatis infection because results are dependent on proper and adequate collection, absence of inhibitors and sufficient rRNA to be detected.   Treponema Abs w Reflex to RPR and Titer   Result Value Ref Range    Treponema Antibody Total Nonreactive Nonreactive     If you have any questions or concerns, please call the clinic at the number listed above.       Sincerely,      Yevgeniy Admae MD

## 2022-04-14 NOTE — PROGRESS NOTES
"  Assessment & Plan     Routine screening for STI (sexually transmitted infection)  Check below labs  - NEISSERIA GONORRHOEA PCR; Future  - CHLAMYDIA TRACHOMATIS PCR; Future  - Treponema Abs w Reflex to RPR and Titer; Future  - HIV Antigen Antibody Combo; Future  - Hepatitis C Screen Reflex to HCV RNA Quant and Genotype; Future    Start hepatitis B vaccine series     12 minutes spent on the date of the encounter doing chart review, history and exam, documentation and further activities per the note           Return in about 3 months (around 7/14/2022) for recheck ADHD w/ PCP.  Patient instructed to return to clinic or contact us sooner if symptoms worsen or new symptoms develop.     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  He has started a new relationship and his partner requested that he get screened for STIs  He has no current symptoms  He had one partner since his last STI screen about one month ago  A hepatitis B vaccine was recommended to him at that time due to the absence of hepatitis B abs  His partner was a female partner  They used a condom but there was an interval when the condom was removed     Review of Systems         Objective    /87 (BP Location: Right arm, Patient Position: Chair, Cuff Size: Adult Regular)   Pulse 97   Temp 97.9  F (36.6  C) (Temporal)   Resp 16   Ht 1.803 m (5' 11\")   Wt 77.6 kg (171 lb)   SpO2 99%   BMI 23.85 kg/m    Body mass index is 23.85 kg/m .  Physical Exam   GEN:  Healthy young man                 "

## 2022-04-14 NOTE — NURSING NOTE
Prior to immunization administration, verified patients identity using patient s name and date of birth. Please see Immunization Activity for additional information.     Screening Questionnaire for Adult Immunization    Are you sick today?   No   Do you have allergies to medications, food, a vaccine component or latex?   No   Have you ever had a serious reaction after receiving a vaccination?   No   Do you have a long-term health problem with heart, lung, kidney, or metabolic disease (e.g., diabetes), asthma, a blood disorder, no spleen, complement component deficiency, a cochlear implant, or a spinal fluid leak?  Are you on long-term aspirin therapy?   No   Do you have cancer, leukemia, HIV/AIDS, or any other immune system problem?   No   Do you have a parent, brother, or sister with an immune system problem?   No   In the past 3 months, have you taken medications that affect  your immune system, such as prednisone, other steroids, or anticancer drugs; drugs for the treatment of rheumatoid arthritis, Crohn s disease, or psoriasis; or have you had radiation treatments?   No   Have you had a seizure, or a brain or other nervous system problem?   No   During the past year, have you received a transfusion of blood or blood    products, or been given immune (gamma) globulin or antiviral drug?   No   For women: Are you pregnant or is there a chance you could become       pregnant during the next month?   No   Have you received any vaccinations in the past 4 weeks?   No     Immunization questionnaire answers were all negative.        Per orders of Dr. Adame, injection of Hep B #1 of 3  given by Liliana Christie CMA. Patient instructed to remain in clinic for 15 minutes afterwards, and to report any adverse reaction to me immediately.       Screening performed by Liliana Christie CMA on 4/14/2022 at 3:38 PM.

## 2022-04-15 LAB
C TRACH DNA SPEC QL NAA+PROBE: NEGATIVE
HCV AB SERPL QL IA: NONREACTIVE
HIV 1+2 AB+HIV1 P24 AG SERPL QL IA: NONREACTIVE
N GONORRHOEA DNA SPEC QL NAA+PROBE: NEGATIVE
T PALLIDUM AB SER QL: NONREACTIVE

## 2022-04-15 NOTE — RESULT ENCOUNTER NOTE
The following letter pertains to your most recent diagnostic tests:    HIV and hepatitis C tests are negative.      Sincerely,    Dr. Adame

## 2022-04-15 NOTE — RESULT ENCOUNTER NOTE
The following letter pertains to your most recent diagnostic tests:    Syphilis, gonorrhea and chlamydia tests are negative.      Sincerely,    Dr. Adame

## 2022-05-15 ENCOUNTER — HEALTH MAINTENANCE LETTER (OUTPATIENT)
Age: 37
End: 2022-05-15

## 2022-05-23 DIAGNOSIS — F90.2 ATTENTION DEFICIT HYPERACTIVITY DISORDER (ADHD), COMBINED TYPE: ICD-10-CM

## 2022-05-23 NOTE — TELEPHONE ENCOUNTER
PN,   Patient calling about May Adderall Rx  Rx for once daily but #60  Pt on BID dosing   Pended new Rx if you approve  Thanks,   Rebeca SAGASTUME RN

## 2022-05-24 RX ORDER — DEXTROAMPHETAMINE SACCHARATE, AMPHETAMINE ASPARTATE, DEXTROAMPHETAMINE SULFATE AND AMPHETAMINE SULFATE 5; 5; 5; 5 MG/1; MG/1; MG/1; MG/1
TABLET ORAL
Qty: 60 TABLET | Refills: 0 | Status: SHIPPED | OUTPATIENT
Start: 2022-05-24 | End: 2022-08-22

## 2022-05-24 NOTE — TELEPHONE ENCOUNTER
Triage,   Germán bryson.   Following up on Rx below.   Please advise.   Thanks!  Ariela OLIVEIRA

## 2022-05-24 NOTE — TELEPHONE ENCOUNTER
Filled for 30 more days -   Looks like he got the 30 tablets which should last 15 days.  He is due for follow-up visit when that Rx is ends.  Not sure if he wants to establish with me or another provider (saw harley) or Alona MORRIS

## 2022-06-05 NOTE — PROGRESS NOTES
Germán is a 37 year old who is being evaluated via a billable video visit.      How would you like to obtain your AVS? MyChart  If the video visit is dropped, the invitation should be resent by: Text to cell phone: [phone]  Will anyone else be joining your video visit? No     Video Start Time: 11:43 AM    Assessment & Plan     Other insomnia  Patient uses ambien qty 10 will last 3 months - refill x1 so should last 6 months   - zolpidem (AMBIEN) 5 MG tablet; Take 1 tablet (5 mg) by mouth nightly as needed for sleep    Delayed sleep phase syndrome  As above   - zolpidem (AMBIEN) 5 MG tablet; Take 1 tablet (5 mg) by mouth nightly as needed for sleep    Back muscle spasm  Refilled   - cyclobenzaprine (FLEXERIL) 10 MG tablet; Take 0.5-1 tablets (5-10 mg) by mouth 3 times daily as needed for muscle spasms    Upper back strain, sequela     - cyclobenzaprine (FLEXERIL) 10 MG tablet; Take 0.5-1 tablets (5-10 mg) by mouth 3 times daily as needed for muscle spasms    Chronic migraine without aura without status migrainosus, not intractable  Refilled   - rizatriptan (MAXALT-MLT) 5 MG ODT; Take 1 tablet (5 mg) by mouth at onset of headache for migraine May repeat in 2 hours. Max 6 tablets/24 hours.    Attention deficit hyperactivity disorder (ADHD), combined type   refilled next 3 months of medication  Pt thinking of moving to [place] in late summer - advised appt prior to leaving so I can fill 3 month supply -  Once he knows he should call PCP clinics in [place] to establish care  - amphetamine-dextroamphetamine (ADDERALL) 20 MG tablet; Take 1 tablet (20 mg) by mouth 2 times daily for 30 days  - amphetamine-dextroamphetamine (ADDERALL) 20 MG tablet; Take 1 tablet (20 mg) by mouth 2 times daily for 30 days  - amphetamine-dextroamphetamine (ADDERALL) 20 MG tablet; Take 1 tablet (20 mg) by mouth 2 times daily for 30 days                 No follow-ups on file.    Clare Anaya, [facility]    Subjective    Germán is a 37 year old who presents for the following health issues     HPI     ADHD Follow-Up    Date of last ADHD office visit: 03/11/2022  Status since last visit: Stable  Taking controlled (daily) medications as prescribed: Yes                       Parent/Patient Concerns with Medications: None  ADHD Medication     Amphetamines Disp Start End     amphetamine-dextroamphetamine (ADDERALL) 20 MG tablet    60 tablet 5/24/2022     Sig: Take 1 tablet (20 mg) by mouth 2 times daily (4-)    Class: E-Prescribe    Earliest Fill Date: 5/24/2022    Prior authorization: Closed - Prior Authorization not required for patient/medication     amphetamine-dextroamphetamine (ADDERALL) 20 MG tablet    60 tablet 5/12/2022 6/11/2022    Sig - Route: Take 1 tablet (20 mg) by mouth daily - Oral    Class: E-Prescribe    Earliest Fill Date: 5/9/2022    Prior authorization: Closed - Prior Authorization not required for patient/medication     amphetamine-dextroamphetamine (ADDERALL) 20 MG tablet    60 tablet 1/17/2022     Sig - Route: Take 1 tablet (20 mg) by mouth 2 times daily - Oral    Class: E-Prescribe    Earliest Fill Date: 1/17/2022    Prior authorization: Closed - Prior Authorization not required for patient/medication     amphetamine-dextroamphetamine (ADDERALL) 20 MG tablet    60 tablet 1/17/2022     Sig - Route: Take 1 tablet (20 mg) by mouth 2 times daily - Oral    Class: E-Prescribe    Earliest Fill Date: 1/17/2022    Prior authorization: Closed - Prior Authorization duplicate/in process     amphetamine-dextroamphetamine (ADDERALL) 20 MG tablet    60 tablet 12/17/2021     Sig - Route: Take 1 tablet (20 mg) by mouth 2 times daily - Oral    Class: E-Prescribe    Earliest Fill Date: 12/17/2021    Prior authorization: Closed - Prior Authorization duplicate/in process        Adderall feels normal on the medication -   Take in AM and repeat early afternoon -         Medication Benefits:   Controlled symptoms:  Distractability, Finishing tasks and Accepting limits      Medication side effects:  Side effects noted: none                Review of Systems   Constitutional, HEENT, cardiovascular, pulmonary, gi and gu systems are negative, except as otherwise noted.      Objective           Vitals:  No vitals were obtained today due to virtual visit.    Physical Exam   GENERAL: Healthy, alert and no distress  EYES: Eyes grossly normal to inspection.  No discharge or erythema, or obvious scleral/conjunctival abnormalities.  RESP: No audible wheeze, cough, or visible cyanosis.  No visible retractions or increased work of breathing.    SKIN: Visible skin clear. No significant rash, abnormal pigmentation or lesions.  NEURO: Cranial nerves grossly intact.  Mentation and speech appropriate for age.  PSYCH: Mentation appears normal, affect normal/bright, judgement and insight intact, normal speech and appearance well-groomed.                Video-Visit Details    Type of service:  Video Visit    Video End Time:11:58 AM    Originating Location (pt. Location): Home    Distant Location (provider location):  [facility]     Platform used for Video Visit: Texere

## 2022-06-08 ENCOUNTER — VIRTUAL VISIT (OUTPATIENT)
Dept: FAMILY MEDICINE | Facility: CLINIC | Age: 37
End: 2022-06-08
Payer: COMMERCIAL

## 2022-06-08 DIAGNOSIS — F90.2 ATTENTION DEFICIT HYPERACTIVITY DISORDER (ADHD), COMBINED TYPE: Primary | ICD-10-CM

## 2022-06-08 DIAGNOSIS — G47.09 OTHER INSOMNIA: ICD-10-CM

## 2022-06-08 DIAGNOSIS — M62.830 BACK MUSCLE SPASM: ICD-10-CM

## 2022-06-08 DIAGNOSIS — G43.709 CHRONIC MIGRAINE WITHOUT AURA WITHOUT STATUS MIGRAINOSUS, NOT INTRACTABLE: ICD-10-CM

## 2022-06-08 DIAGNOSIS — G47.21 DELAYED SLEEP PHASE SYNDROME: ICD-10-CM

## 2022-06-08 DIAGNOSIS — S29.012S UPPER BACK STRAIN, SEQUELA: ICD-10-CM

## 2022-06-08 PROCEDURE — 99213 OFFICE O/P EST LOW 20 MIN: CPT | Mod: 95 | Performed by: FAMILY MEDICINE

## 2022-06-08 RX ORDER — RIZATRIPTAN BENZOATE 5 MG/1
5 TABLET, ORALLY DISINTEGRATING ORAL
Qty: 18 TABLET | Refills: 3 | Status: SHIPPED | OUTPATIENT
Start: 2022-06-08

## 2022-06-08 RX ORDER — ZOLPIDEM TARTRATE 5 MG/1
5 TABLET ORAL
Qty: 10 TABLET | Refills: 1 | Status: SHIPPED | OUTPATIENT
Start: 2022-06-08

## 2022-06-08 RX ORDER — DEXTROAMPHETAMINE SACCHARATE, AMPHETAMINE ASPARTATE, DEXTROAMPHETAMINE SULFATE AND AMPHETAMINE SULFATE 5; 5; 5; 5 MG/1; MG/1; MG/1; MG/1
20 TABLET ORAL 2 TIMES DAILY
Qty: 60 TABLET | Refills: 0 | Status: SHIPPED | OUTPATIENT
Start: 2022-08-23 | End: 2022-08-22

## 2022-06-08 RX ORDER — DEXTROAMPHETAMINE SACCHARATE, AMPHETAMINE ASPARTATE, DEXTROAMPHETAMINE SULFATE AND AMPHETAMINE SULFATE 5; 5; 5; 5 MG/1; MG/1; MG/1; MG/1
20 TABLET ORAL 2 TIMES DAILY
Qty: 60 TABLET | Refills: 0 | Status: SHIPPED | OUTPATIENT
Start: 2022-06-18 | End: 2022-07-18

## 2022-06-08 RX ORDER — CYCLOBENZAPRINE HCL 10 MG
5-10 TABLET ORAL 3 TIMES DAILY PRN
Qty: 15 TABLET | Refills: 1 | Status: SHIPPED | OUTPATIENT
Start: 2022-06-08

## 2022-06-08 RX ORDER — DEXTROAMPHETAMINE SACCHARATE, AMPHETAMINE ASPARTATE, DEXTROAMPHETAMINE SULFATE AND AMPHETAMINE SULFATE 5; 5; 5; 5 MG/1; MG/1; MG/1; MG/1
20 TABLET ORAL 2 TIMES DAILY
Qty: 60 TABLET | Refills: 0 | Status: SHIPPED | OUTPATIENT
Start: 2022-07-23 | End: 2022-08-22

## 2022-07-19 ENCOUNTER — TELEPHONE (OUTPATIENT)
Dept: FAMILY MEDICINE | Facility: CLINIC | Age: 37
End: 2022-07-19

## 2022-07-19 DIAGNOSIS — F90.0 ATTENTION DEFICIT HYPERACTIVITY DISORDER (ADHD), PREDOMINANTLY INATTENTIVE TYPE: Primary | ICD-10-CM

## 2022-07-19 RX ORDER — DEXTROAMPHETAMINE SACCHARATE, AMPHETAMINE ASPARTATE, DEXTROAMPHETAMINE SULFATE AND AMPHETAMINE SULFATE 5; 5; 5; 5 MG/1; MG/1; MG/1; MG/1
20 TABLET ORAL 2 TIMES DAILY
Qty: 60 TABLET | Refills: 0 | Status: SHIPPED | OUTPATIENT
Start: 2022-07-19 | End: 2022-08-22

## 2022-07-19 NOTE — TELEPHONE ENCOUNTER
LICHA (DOD),   Please see below  Fill date on Rx is 7/20/2022  Pharmacy will likely need ok though too because last filled 6/24/2022 per Epic        Ok to fill early?  Rebeca SAGASTUME RN

## 2022-07-19 NOTE — TELEPHONE ENCOUNTER
Triage,   Germán called.  Wondering if he can fill his amphetamine-dextroamphetamine (ADDERALL) 20 MG tablet early at the [facility] pharmacy in [place]?  Fill date is set for 7/23 but patient is leaving for [place] tomorrow.   Please advise.   Thanks!  Ariela OLIVEIRA

## 2022-08-22 ENCOUNTER — OFFICE VISIT (OUTPATIENT)
Dept: FAMILY MEDICINE | Facility: CLINIC | Age: 37
End: 2022-08-22
Payer: COMMERCIAL

## 2022-08-22 VITALS
DIASTOLIC BLOOD PRESSURE: 68 MMHG | SYSTOLIC BLOOD PRESSURE: 106 MMHG | OXYGEN SATURATION: 98 % | WEIGHT: 163 LBS | BODY MASS INDEX: 22.73 KG/M2 | RESPIRATION RATE: 18 BRPM | HEART RATE: 85 BPM | TEMPERATURE: 97.6 F

## 2022-08-22 DIAGNOSIS — Z11.3 SCREEN FOR STD (SEXUALLY TRANSMITTED DISEASE): ICD-10-CM

## 2022-08-22 DIAGNOSIS — F90.0 ATTENTION DEFICIT HYPERACTIVITY DISORDER (ADHD), PREDOMINANTLY INATTENTIVE TYPE: Primary | ICD-10-CM

## 2022-08-22 DIAGNOSIS — Z48.02 ENCOUNTER FOR STAPLE REMOVAL: ICD-10-CM

## 2022-08-22 PROCEDURE — 87491 CHLMYD TRACH DNA AMP PROBE: CPT | Performed by: FAMILY MEDICINE

## 2022-08-22 PROCEDURE — 99214 OFFICE O/P EST MOD 30 MIN: CPT | Performed by: FAMILY MEDICINE

## 2022-08-22 PROCEDURE — 87591 N.GONORRHOEAE DNA AMP PROB: CPT | Performed by: FAMILY MEDICINE

## 2022-08-22 RX ORDER — DEXTROAMPHETAMINE SACCHARATE, AMPHETAMINE ASPARTATE, DEXTROAMPHETAMINE SULFATE AND AMPHETAMINE SULFATE 5; 5; 5; 5 MG/1; MG/1; MG/1; MG/1
20 TABLET ORAL 2 TIMES DAILY
Qty: 60 TABLET | Refills: 0 | Status: SHIPPED | OUTPATIENT
Start: 2022-09-22 | End: 2022-10-22

## 2022-08-22 RX ORDER — DEXTROAMPHETAMINE SACCHARATE, AMPHETAMINE ASPARTATE, DEXTROAMPHETAMINE SULFATE AND AMPHETAMINE SULFATE 5; 5; 5; 5 MG/1; MG/1; MG/1; MG/1
20 TABLET ORAL 2 TIMES DAILY
Qty: 60 TABLET | Refills: 0 | Status: SHIPPED | OUTPATIENT
Start: 2022-10-23 | End: 2022-11-22

## 2022-08-22 RX ORDER — DEXTROAMPHETAMINE SACCHARATE, AMPHETAMINE ASPARTATE, DEXTROAMPHETAMINE SULFATE AND AMPHETAMINE SULFATE 5; 5; 5; 5 MG/1; MG/1; MG/1; MG/1
20 TABLET ORAL 2 TIMES DAILY
Qty: 60 TABLET | Refills: 0 | Status: SHIPPED | OUTPATIENT
Start: 2022-08-22 | End: 2022-09-21

## 2022-08-22 ASSESSMENT — PAIN SCALES - GENERAL: PAINLEVEL: NO PAIN (0)

## 2022-08-22 NOTE — PROGRESS NOTES
Assessment & Plan     Attention deficit hyperactivity disorder (ADHD), predominantly inattentive type  Overall stable and med working well. 3 mo rx given. Reviewed that rx's are only valid in [place]. He will have friend likely mail Rx to him until he can establish with new provider  - amphetamine-dextroamphetamine (ADDERALL) 20 MG tablet; Take 1 tablet (20 mg) by mouth 2 times daily for 30 days  - amphetamine-dextroamphetamine (ADDERALL) 20 MG tablet; Take 1 tablet (20 mg) by mouth 2 times daily for 30 days  - amphetamine-dextroamphetamine (ADDERALL) 20 MG tablet; Take 1 tablet (20 mg) by mouth 2 times daily for 30 days    Screen for STD (sexually transmitted disease)  Decline blood screening, likely too early anyhow to . rec rescreen with labs in 2-3 months when he establishes with new provider  - NEISSERIA GONORRHOEA PCR  - CHLAMYDIA TRACHOMATIS PCR    Encounter for staple removal  5 staples easily removed. Large scab present over wound. Reviewed wound cares.      25 minutes spent on the date of the encounter doing chart review, patient visit and documentation       Return in about 3 months (around 11/22/2022) for with new provider in [place], med check.    Lottie Thompson MD  [facility] AGNES Dunlap is a 37 year old, presenting for the following health issues:  A.D.H.D      A.D.H.D    History of Present Illness       Reason for visit:  Staple removal and med check    He eats 2-3 servings of fruits and vegetables daily.He consumes 2 sweetened beverage(s) daily.He exercises with enough effort to increase his heart rate 60 or more minutes per day.  He exercises with enough effort to increase his heart rate 5 days per week.   He is taking medications regularly.       -pt states that he has staples back of his head, 5 staples, no pain  -pt states that he is moving to [place] in 2-3 weeks    Fell and hit his head 10 days ago when he went to stand up while doing an intentional  breath hold (working on a practice of this)  Went to ER for staples, but can't remember the name of the ER.   No concussion  No n/v/memory/dizziness/headache/neuro issues. Feeling like wound healing well.   No f/c. Used abx ointment.         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   Hard to maintain normal life habits without it.   Sleep and appetite have been fine. No AE   Mood has been good.     STI testing fairly recently but then did have unprotected sex 2 weeks ago. Partner had recently been tested and negative (known her for a long time and trusts her)          Objective    /68   Pulse 85   Temp 97.6  F (36.4  C) (Tympanic)   Resp 18   Wt 73.9 kg (163 lb)   SpO2 98%   BMI 22.73 kg/m    Body mass index is 22.73 kg/m .  Physical Exam   GENERAL: healthy, alert and no distress  EYES: Eyes grossly normal to inspection, PERRL and conjunctivae and sclerae normal  NECK: no adenopathy, no asymmetry, masses, or scars and thyroid normal to palpation  RESP: lungs clear to auscultation - no rales, rhonchi or wheezes  CV: regular rate and rhythm, normal S1 S2, no S3 or S4, no murmur, click or rub, no peripheral edema and peripheral pulses strong  MS: no gross musculoskeletal defects noted, no edema  NEURO: Normal strength and tone, mentation intact, speech normal and cranial nerves 2-12 intact  Skin: posterior occiput in hair vertical wound approx 2-3 cm in length that is well approximated with 5 staples. Fair amt of eschar is formed. No tenderness, discharge or redness. Staples are easily removed with staple remover                  .  ..

## 2022-08-23 LAB
C TRACH DNA SPEC QL NAA+PROBE: NEGATIVE
N GONORRHOEA DNA SPEC QL NAA+PROBE: NEGATIVE

## 2022-08-23 NOTE — RESULT ENCOUNTER NOTE
Peter,  It was a pleasure to see you in the office recently.   Both the chlamydia and gonorrhea screening tests were negative.  Please MyChart or call if you have any concerns or questions.   Sincerely,  Lottie Thompson MD

## 2022-09-04 ENCOUNTER — HEALTH MAINTENANCE LETTER (OUTPATIENT)
Age: 37
End: 2022-09-04

## 2023-06-03 ENCOUNTER — HEALTH MAINTENANCE LETTER (OUTPATIENT)
Age: 38
End: 2023-06-03

## 2024-05-09 NOTE — MR AVS SNAPSHOT
"              After Visit Summary   1/9/2018    Germán Hunter    MRN: [id number]           Patient Information     Date Of Birth          1985        Visit Information        Provider Department      1/9/2018 12:00 PM Tong Sage MD [facility]        Today's Diagnoses     Attention deficit hyperactivity disorder (ADHD), combined type    -  1    Upper back strain, sequela           Follow-ups after your visit        Who to contact     If you have questions or need follow up information about today's clinic visit or your schedule please contact [facility] directly at [phone].  Normal or non-critical lab and imaging results will be communicated to you by MyChart, letter or phone within 4 business days after the clinic has received the results. If you do not hear from us within 7 days, please contact the clinic through [employer] or phone. If you have a critical or abnormal lab result, we will notify you by phone as soon as possible.  Submit refill requests through [employer] or call your pharmacy and they will forward the refill request to us. Please allow 3 business days for your refill to be completed.          Additional Information About Your Visit        MyChart Information     [employer] gives you secure access to your electronic health record. If you see a primary care provider, you can also send messages to your care team and make appointments. If you have questions, please call your primary care clinic.  If you do not have a primary care provider, please call [phone] and they will assist you.        Care EveryWhere ID     This is your Care EveryWhere ID. This could be used by other organizations to access your [facility] medical records  [id number]        Your Vitals Were     Pulse Temperature Respirations Height Pulse Oximetry BMI (Body Mass Index)    94 97.3  F (36.3  C) (Tympanic) 14 5' 11\" (1.803 m) 98% 25.38 kg/m2       Blood Pressure from Last 3 Encounters: "   01/09/18 104/70   10/13/17 127/85   07/07/17 100/70    Weight from Last 3 Encounters:   01/09/18 182 lb (82.6 kg)   10/13/17 179 lb 4.8 oz (81.3 kg)   07/07/17 181 lb (82.1 kg)              We Performed the Following     Glucose     Lipid panel reflex to direct LDL Fasting          Today's Medication Changes          These changes are accurate as of: 1/9/18  2:09 PM.  If you have any questions, ask your nurse or doctor.               Start taking these medicines.        Dose/Directions    celecoxib 100 MG capsule   Commonly known as:  celeBREX   Used for:  Upper back strain, sequela   Started by:  Tong Sage MD        Dose:  100 mg   Take 1 capsule (100 mg) by mouth 2 times daily as needed for moderate pain   Quantity:  60 capsule   Refills:  1         These medicines have changed or have updated prescriptions.        Dose/Directions    * amphetamine-dextroamphetamine 20 MG per tablet   Commonly known as:  ADDERALL   This may have changed:  Another medication with the same name was added. Make sure you understand how and when to take each.   Used for:  Attention deficit hyperactivity disorder (ADHD), combined type   Changed by:  Tong Sage MD        Dose:  20 mg   Take 1 tablet (20 mg) by mouth 2 times daily   Quantity:  60 tablet   Refills:  0       * amphetamine-dextroamphetamine 20 MG per tablet   Commonly known as:  ADDERALL   This may have changed:  Another medication with the same name was added. Make sure you understand how and when to take each.   Used for:  Attention deficit hyperactivity disorder (ADHD), combined type   Changed by:  Tong Sage MD        Dose:  20 mg   Take 1 tablet (20 mg) by mouth 2 times daily   Quantity:  60 tablet   Refills:  0       * amphetamine-dextroamphetamine 20 MG per tablet   Commonly known as:  ADDERALL   This may have changed:  Another medication with the same name was added. Make sure you understand how and when to take each.    Used for:  Attention deficit hyperactivity disorder (ADHD), combined type   Changed by:  Tong Sage MD        Dose:  20 mg   Take 1 tablet (20 mg) by mouth 2 times daily   Quantity:  60 tablet   Refills:  0       * amphetamine-dextroamphetamine 20 MG per 24 hr capsule   Commonly known as:  ADDERALL XR   This may have changed:  You were already taking a medication with the same name, and this prescription was added. Make sure you understand how and when to take each.   Used for:  Attention deficit hyperactivity disorder (ADHD), combined type   Changed by:  Tong Sage MD        Dose:  20 mg   Take 1 capsule (20 mg) by mouth daily   Quantity:  30 capsule   Refills:  0       * amphetamine-dextroamphetamine 20 MG per 24 hr capsule   Commonly known as:  ADDERALL XR   This may have changed:  You were already taking a medication with the same name, and this prescription was added. Make sure you understand how and when to take each.   Used for:  Attention deficit hyperactivity disorder (ADHD), combined type   Changed by:  Tong Sage MD        Dose:  20 mg   Start taking on:  2/9/2018   Take 1 capsule (20 mg) by mouth daily   Quantity:  30 capsule   Refills:  0       * amphetamine-dextroamphetamine 20 MG per 24 hr capsule   Commonly known as:  ADDERALL XR   This may have changed:  You were already taking a medication with the same name, and this prescription was added. Make sure you understand how and when to take each.   Used for:  Attention deficit hyperactivity disorder (ADHD), combined type   Changed by:  Tong Sage MD        Dose:  20 mg   Start taking on:  3/12/2018   Take 1 capsule (20 mg) by mouth daily   Quantity:  30 capsule   Refills:  0       * Notice:  This list has 6 medication(s) that are the same as other medications prescribed for you. Read the directions carefully, and ask your doctor or other care provider to review them with you.      Stop taking  these medicines if you haven't already. Please contact your care team if you have questions.     meloxicam 15 MG tablet   Commonly known as:  MOBIC   Stopped by:  Tong Sage MD                Where to get your medicines      These medications were sent to [facility] [id number] [facility] - [place], [place] - [address]  [address] [address]     Phone:  [phone]     celecoxib 100 MG capsule         Some of these will need a paper prescription and others can be bought over the counter.  Ask your nurse if you have questions.     Bring a paper prescription for each of these medications     amphetamine-dextroamphetamine 20 MG per 24 hr capsule    amphetamine-dextroamphetamine 20 MG per 24 hr capsule    amphetamine-dextroamphetamine 20 MG per 24 hr capsule    amphetamine-dextroamphetamine 20 MG per tablet    amphetamine-dextroamphetamine 20 MG per tablet    amphetamine-dextroamphetamine 20 MG per tablet                Primary Care Provider Office Phone # Fax #    Tong Sage [phone] [address] [address]        Equal Access to Services     [facility]: Hadii aad ku hadasho Soomaali, waaxda luqadaha, qaybta kaalmada adeegyada, waxay idiin hayaan amanda nicholson . So [facility] [phone].    ATENCIÓN: Si habla español, tiene a moulton disposición servicios gratuitos de asistencia lingüística. Llame al [phone].    We comply with applicable federal civil rights laws and [place] laws. We do not discriminate on the basis of race, color, national origin, age, disability, sex, sexual orientation, or gender identity.            Thank you!     Thank you for choosing [facility]  for your care. Our goal is always to provide you with excellent care. Hearing back from our patients is one way we can continue to improve our services. Please take a few minutes to complete the written survey that you may receive in the mail  after your visit with us. Thank you!             Your Updated Medication List - Protect others around you: Learn how to safely use, store and throw away your medicines at www.disposemymeds.org.          This list is accurate as of: 1/9/18  2:09 PM.  Always use your most recent med list.                   Brand Name Dispense Instructions for use Diagnosis    * amphetamine-dextroamphetamine 20 MG per tablet    ADDERALL    60 tablet    Take 1 tablet (20 mg) by mouth 2 times daily    Attention deficit hyperactivity disorder (ADHD), combined type       * amphetamine-dextroamphetamine 20 MG per tablet    ADDERALL    60 tablet    Take 1 tablet (20 mg) by mouth 2 times daily    Attention deficit hyperactivity disorder (ADHD), combined type       * amphetamine-dextroamphetamine 20 MG per tablet    ADDERALL    60 tablet    Take 1 tablet (20 mg) by mouth 2 times daily    Attention deficit hyperactivity disorder (ADHD), combined type       * amphetamine-dextroamphetamine 20 MG per 24 hr capsule    ADDERALL XR    30 capsule    Take 1 capsule (20 mg) by mouth daily    Attention deficit hyperactivity disorder (ADHD), combined type       * amphetamine-dextroamphetamine 20 MG per 24 hr capsule   Start taking on:  2/9/2018    ADDERALL XR    30 capsule    Take 1 capsule (20 mg) by mouth daily    Attention deficit hyperactivity disorder (ADHD), combined type       * amphetamine-dextroamphetamine 20 MG per 24 hr capsule   Start taking on:  3/12/2018    ADDERALL XR    30 capsule    Take 1 capsule (20 mg) by mouth daily    Attention deficit hyperactivity disorder (ADHD), combined type       celecoxib 100 MG capsule    celeBREX    60 capsule    Take 1 capsule (100 mg) by mouth 2 times daily as needed for moderate pain    Upper back strain, sequela       cyclobenzaprine 10 MG tablet    FLEXERIL    15 tablet    Take 0.5-1 tablets (5-10 mg) by mouth 3 times daily as needed for muscle spasms    Back muscle spasm       * Notice:  This list has  6 medication(s) that are the same as other medications prescribed for you. Read the directions carefully, and ask your doctor or other care provider to review them with you.       [employer]

## 2024-07-07 ENCOUNTER — HEALTH MAINTENANCE LETTER (OUTPATIENT)
Age: 39
End: 2024-07-07

## 2025-07-13 ENCOUNTER — HEALTH MAINTENANCE LETTER (OUTPATIENT)
Age: 40
End: 2025-07-13